# Patient Record
Sex: FEMALE | Race: ASIAN | NOT HISPANIC OR LATINO | Employment: FULL TIME | URBAN - METROPOLITAN AREA
[De-identification: names, ages, dates, MRNs, and addresses within clinical notes are randomized per-mention and may not be internally consistent; named-entity substitution may affect disease eponyms.]

---

## 2020-08-11 ENCOUNTER — OFFICE VISIT (OUTPATIENT)
Dept: OBGYN | Facility: CLINIC | Age: 35
End: 2020-08-11
Payer: COMMERCIAL

## 2020-08-11 VITALS
HEART RATE: 90 BPM | SYSTOLIC BLOOD PRESSURE: 111 MMHG | HEIGHT: 59 IN | BODY MASS INDEX: 24.39 KG/M2 | OXYGEN SATURATION: 98 % | DIASTOLIC BLOOD PRESSURE: 74 MMHG | WEIGHT: 121 LBS

## 2020-08-11 DIAGNOSIS — Z01.42 PAP SMEAR OF CERVIX TO CONFIRM NORMAL SMEAR FOLLOWING ABNORMAL SMEAR: ICD-10-CM

## 2020-08-11 DIAGNOSIS — Z30.433 ENCOUNTER FOR IUD REMOVAL AND REINSERTION: Primary | ICD-10-CM

## 2020-08-11 PROCEDURE — G0145 SCR C/V CYTO,THINLAYER,RESCR: HCPCS | Performed by: OBSTETRICS & GYNECOLOGY

## 2020-08-11 PROCEDURE — 58300 INSERT INTRAUTERINE DEVICE: CPT | Performed by: OBSTETRICS & GYNECOLOGY

## 2020-08-11 PROCEDURE — G0476 HPV COMBO ASSAY CA SCREEN: HCPCS | Performed by: OBSTETRICS & GYNECOLOGY

## 2020-08-11 PROCEDURE — 58301 REMOVE INTRAUTERINE DEVICE: CPT | Performed by: OBSTETRICS & GYNECOLOGY

## 2020-08-11 ASSESSMENT — MIFFLIN-ST. JEOR: SCORE: 1149.48

## 2020-08-11 NOTE — PROGRESS NOTES
Nna Ramos is a 35 year old  who desires a new Mirena IUD.  She feels well and denies signif sx. No contraindications upon review. No LMP recorded. (Menstrual status: IUD).   Menses every 1 month x 3-5 days.   Had considered ParaGard for 10 yr benefit but has done well with Mirena and will continue. Some dyspareunia for  recently and wondered about trimming strings shorter now.   Past ASCUS in 2014 and neg HRHPV and overdue for follow-up.   Family doing well except mother has brain cancer for past two yrs and followed at [facility].     Past medical, obstetrical, surgical, family and social history reviewed and as noted or updated in chart.     IUD contraception and insertion procedure discussed including method, effectiveness, limitations, proper use, risks, benefits and alternatives.  She has reviewed the package insert information and additional educational materials and desires to proceed.     PROCEDURE:    IUD Removal    I discussed the removal procedure, objectives, risks, complications and future contraceptive methods as indicated.  Patient understood and desired to proceed.    After appropriate preparation, a Pap/HRHPV was taken and the IUD tip was noted protruding at the cervical os along with curled up strings. The Mirena IUD was removed intact. No complications.     Mirena Insertion    A preliminary bimanual exam was performed and was normal with posterior normal size uterus.  The cervix was cleansed with Betadine. A cervical tenaculum was placed and the uterus sounded to 7 cm. Maintaining sterile technique, the IUD was inserted into the uterine cavity and the strings trimmed to 2.5 to 3 cm. No complications. Patient tolerated the procedure well.   NDC: 18368-209-78. Lot: [id number]. Exp: .    Instructions and precautions reviewed. RTC in 5 weeks for recheck. Shorten strings prn.     Assessment:   Encounter Diagnoses   Name Primary?     Encounter for IUD removal and reinsertion Yes     Pap smear  of cervix to confirm normal smear following abnormal smear      I reviewed the condition, causes, differential diagnosis, prognosis, evaluation and management considerations and options.  Questions answered and information given. See orders.     Plan and Recommendations:     Total encounter time (physician together with patient) = 25min. Direct counseling, education and care coordination time (physician together with patient) = 15min. with this additional separate time spent counseling on the evaluation and management of the patient's condition(s) beyond discussion of  the procedure itself including its risks, benefits and alternatives and beyond preliminary examination and performance of the procedure itself.    A/P:  Nan was seen today for iud.    Diagnoses and all orders for this visit:    Encounter for IUD removal and reinsertion  -     INSERT INTRAUTERINE DEVICE [51734]  -     levonorgestrel (MIRENA) 20 MCG/24HR IUD 20 mcg  -     REMOVE INTRAUTERINE DEVICE    Pap smear of cervix to confirm normal smear following abnormal smear  -     Pap imaged thin layer diagnostic with HPV (select HPV order below)  -     HPV High Risk Types DNA Cervical          Jay Franco MD

## 2020-08-11 NOTE — PATIENT INSTRUCTIONS
If you have any questions regarding your visit, Please contact your care team.     [email or web address]: [phone]  [facility] CLINIC HOURS TELEPHONE NUMBER       Jay Franco M.D.      Joycelyn Santa-Medical Assistant    Salud-  Ragini-     Monday-[place]  8:00a.m-4:45 p.m  Tuesday-[place]  9:00a.m-4:00 p.m  Wednesday-[place] 8:00a.m-4:45 p.m.  Thursday-[place]  8:00a.m-4:45 p.m.  Friday-[place]  8:00a.m-4:45 p.m. [facility]  [address]  [place], [place] [address] [phone] ask [facility]  [phone] Fax  Imaging [phone]    [facility]  [address]  [place], [place] [address] [phone]    [facility]  [address] [place] [address] [phone] [facility]  [phone] Fax  Imaging [phone]     Urgent Care locations:    [facility] Monday-Friday  5 pm - 9 pm  Saturday and Sunday   9 am - 5 pm  Monday-Friday   11 am - 9 pm  Saturday and Sunday   9 am - 5 pm   [phone]   If you need a medication refill, please contact your pharmacy. Please allow 3 business days for your refill to be completed.  As always, Thank you for trusting us with your healthcare needs!

## 2020-08-11 NOTE — LETTER
August 20, 2020    Nan Ramos  [address] [address]    Dear ,  This letter is regarding your recent Pap smear (cervical cancer screening) and Human Papillomavirus (HPV) test.  We are happy to inform you that your Pap smear result is normal. Cervical cancer is closely linked with certain types of HPV. Your results showed no evidence of high-risk HPV.  We recommend you have your next PAP smear and HPV test in 5 years.  You will still need to return to the clinic every year for an annual exam and other preventive tests.  If you have additional questions regarding this result, please call our registered nurse, Hannah at [phone].  Sincerely,    Jay Franco MD //rosanna

## 2020-08-14 LAB
COPATH REPORT: NORMAL
PAP: NORMAL

## 2020-08-18 LAB
FINAL DIAGNOSIS: NORMAL
HPV HR 12 DNA CVX QL NAA+PROBE: NEGATIVE
HPV16 DNA SPEC QL NAA+PROBE: NEGATIVE
HPV18 DNA SPEC QL NAA+PROBE: NEGATIVE
SPECIMEN DESCRIPTION: NORMAL
SPECIMEN SOURCE CVX/VAG CYTO: NORMAL

## 2020-09-01 ENCOUNTER — OFFICE VISIT (OUTPATIENT)
Dept: FAMILY MEDICINE | Facility: CLINIC | Age: 35
End: 2020-09-01
Payer: COMMERCIAL

## 2020-09-01 VITALS
HEART RATE: 93 BPM | SYSTOLIC BLOOD PRESSURE: 115 MMHG | OXYGEN SATURATION: 98 % | TEMPERATURE: 97.1 F | WEIGHT: 121 LBS | RESPIRATION RATE: 16 BRPM | HEIGHT: 59 IN | BODY MASS INDEX: 24.39 KG/M2 | DIASTOLIC BLOOD PRESSURE: 73 MMHG

## 2020-09-01 DIAGNOSIS — R51.9 HEADACHE, UNSPECIFIED HEADACHE TYPE: Primary | ICD-10-CM

## 2020-09-01 DIAGNOSIS — M54.9 UPPER BACK PAIN ON RIGHT SIDE: ICD-10-CM

## 2020-09-01 PROCEDURE — 99203 OFFICE O/P NEW LOW 30 MIN: CPT | Performed by: FAMILY MEDICINE

## 2020-09-01 RX ORDER — OMEPRAZOLE 40 MG/1
40 CAPSULE, DELAYED RELEASE ORAL
COMMUNITY
Start: 2020-07-13 | End: 2022-04-26

## 2020-09-01 RX ORDER — CYCLOBENZAPRINE HCL 10 MG
10 TABLET ORAL 3 TIMES DAILY PRN
Qty: 40 TABLET | Refills: 1 | Status: SHIPPED | OUTPATIENT
Start: 2020-09-01 | End: 2022-04-26

## 2020-09-01 RX ORDER — DICLOFENAC SODIUM 75 MG/1
75 TABLET, DELAYED RELEASE ORAL 2 TIMES DAILY PRN
Qty: 60 TABLET | Refills: 3 | Status: SHIPPED | OUTPATIENT
Start: 2020-09-01 | End: 2022-04-26

## 2020-09-01 RX ORDER — ASPIRIN 81 MG
TABLET,CHEWABLE ORAL 4 TIMES DAILY PRN
Qty: 60 G | Refills: 5 | Status: SHIPPED | OUTPATIENT
Start: 2020-09-01 | End: 2022-04-26

## 2020-09-01 ASSESSMENT — MIFFLIN-ST. JEOR: SCORE: 1149.48

## 2020-09-01 NOTE — PROGRESS NOTES
"Subjective     Nan Ramos is a 35 year old female who presents to clinic today for the following health issues:    HPI       Neck Pain  Onset/Duration: couple of months   Description:   Location: right side   Radiation: headache   Intensity: moderate  Progression of Symptoms:  intermittent  Accompanying Signs & Symptoms:  Burning, tingling, prickly sensation in arm(s): YES- right hand   Numbness in arm(s): no  Weakness in arm(s):  no  Fever: no  Headache: YES  Nausea and/or vomiting: no  History:   Trauma: no  Previous neck pain: YES  Previous surgery or injections: no  Previous Imaging (MRI,X ray): no  Precipitating or alleviating factors: None  Does movement impact the pain:  no  Therapies tried and outcome: acetaminophen    -patient states that her mother had similar symptoms and was dx with cancer    Review of Systems   Constitutional, HEENT, cardiovascular, pulmonary, GI, , musculoskeletal, neuro, skin, endocrine and psych systems are negative, except as otherwise noted.      Objective    /73 (BP Location: Left arm, Patient Position: Chair, Cuff Size: Adult Regular)   Pulse 93   Temp 97.1  F (36.2  C) (Tympanic)   Resp 16   Ht 1.499 m (4' 11\")   Wt 54.9 kg (121 lb)   SpO2 98%   BMI 24.44 kg/m    Body mass index is 24.44 kg/m .  Physical Exam   GENERAL: healthy, alert and no distress  NECK: no adenopathy, no asymmetry, masses, or scars and thyroid normal to palpation  RESP: lungs clear to auscultation - no rales, rhonchi or wheezes  CV: regular rate and rhythm, normal S1 S2, no S3 or S4, no murmur, click or rub, no peripheral edema and peripheral pulses strong  ABDOMEN: soft, nontender, no hepatosplenomegaly, no masses and bowel sounds normal  MS: right upper back pain with palpation  NEURO: Normal strength and tone, mentation intact and speech normal      Assessment & Plan     Headache, unspecified headache type  Likely tension headaches. Patient worried about brain cancer due to mother having " similar symptoms. MRI scan ordered for rule out.   - MR Brain w/o & w Contrast; Future    Upper back pain on right side  Likely muscular pain due patient being a . Treat with nsaid, topical capsaisin and muscle relaxer as needed. RTC as needed if no improvements.  - diclofenac (VOLTAREN) 75 MG EC tablet; Take 1 tablet (75 mg) by mouth 2 times daily as needed for moderate pain  - Capsaicin (CAPSAICIN HP) 0.1 % cream; Apply topically 4 times daily as needed  - cyclobenzaprine (FLEXERIL) 10 MG tablet; Take 1 tablet (10 mg) by mouth 3 times daily as needed for muscle spasms       See Patient Instructions    Return in about 6 months (around 3/1/2021).    Shola Blackwell MD, MD  [facility]

## 2020-09-01 NOTE — PATIENT INSTRUCTIONS
At [facility], we strive to deliver an exceptional experience to you, every time we see you.  If you receive a survey in the mail, please send us back your thoughts. We really do value your feedback.    Based on your medical history, these are the current health maintenance/preventive care services that you are due for (some may have been done at this visit.)  Health Maintenance Due   Topic Date Due     PREVENTIVE CARE VISIT  05/21/2015     EYE EXAM  11/12/2015     PHQ-2  01/01/2020     INFLUENZA VACCINE (1) 09/01/2020         Suggested websites for health information:  Www.CENX.LiveExercise : Up to date and easily searchable information on multiple topics.  Www.medlineplus.gov : medication info, interactive tutorials, watch real surgeries online  Www.familydoctor.org : good info from the Academy of Family Physicians  Www.cdc.gov : public health info, travel advisories, epidemics (H1N1)  Www.aap.org : children's health info, normal development, vaccinations  Www.health.[facility].[place].us : [place] dept of health, public health issues in [place], N1N1    Your care team:                            Family Medicine Internal Medicine   MD Victorino Hernandez MD Shantel Branch-Fleming, MD Katya Georgiev PA-C Nam Ho, MD Pediatrics   SALOME Young, MD Selena Darling CNP, MD Deborah Mielke, MD Kim Thein, APRN CNP      Clinic hours: Monday - Thursday 7 am-7 pm; Fridays 7 am-5 pm.   Urgent care: Monday - Friday 11 am-9 pm; Saturday and Sunday 9 am-5 pm.  Pharmacy : Monday -Thursday 8 am-8 pm; Friday 8 am-6 pm; Saturday and Sunday 9 am-5 pm.     Clinic: [phone]   Pharmacy: [phone]

## 2020-09-10 ENCOUNTER — OFFICE VISIT (OUTPATIENT)
Dept: OBGYN | Facility: CLINIC | Age: 35
End: 2020-09-10
Payer: COMMERCIAL

## 2020-09-10 VITALS — DIASTOLIC BLOOD PRESSURE: 81 MMHG | OXYGEN SATURATION: 97 % | HEART RATE: 89 BPM | SYSTOLIC BLOOD PRESSURE: 117 MMHG

## 2020-09-10 DIAGNOSIS — Z30.431 IUD CHECK UP: Primary | ICD-10-CM

## 2020-09-10 DIAGNOSIS — K64.9 HEMORRHOIDS, UNSPECIFIED HEMORRHOID TYPE: ICD-10-CM

## 2020-09-10 PROCEDURE — 99213 OFFICE O/P EST LOW 20 MIN: CPT | Performed by: OBSTETRICS & GYNECOLOGY

## 2020-09-10 NOTE — PATIENT INSTRUCTIONS
If you have any questions regarding your visit, Please contact your care team.     [facility]: [phone]  [facility] CLINIC HOURS TELEPHONE NUMBER       Jay Franco M.D.      Joycelyn Santa-Medical Assistant    Salud-  Ragini-     Monday-[place]  8:00a.m-4:45 p.m  Tuesday-[place]  9:00a.m-4:00 p.m  Wednesday-[place] 8:00a.m-4:45 p.m.  Thursday-[place]  8:00a.m-4:45 p.m.  Friday-[place]  8:00a.m-4:45 p.m. [facility]  [address]  [place], [place] [address] [phone] ask [facility]  [phone] Fax  Imaging [phone]    [facility]  [address]  [place], [place] [address] [phone]    [facility]  [address] [facility] [place] [address] [phone] [facility]  [phone] Fax  Imaging [phone]     Urgent Care locations:    [facility] Monday-Friday  5 pm - 9 pm  Saturday and Sunday   9 am - 5 pm  Monday-Friday   11 am - 9 pm  Saturday and Sunday   9 am - 5 pm   [phone]   If you need a medication refill, please contact your pharmacy. Please allow 3 business days for your refill to be completed.  As always, Thank you for trusting us with your healthcare needs!

## 2020-09-10 NOTE — PROGRESS NOTES
Nan Ramos is a 35 year old year old who is here today for a recheck of IUD.  See prior notes.     Significant interval changes: none.  No signif signs, symptoms or concerns except recurrent prolapsing hemorrhoids. No dyspareunia.     Past medical, obstetrical, surgical, family and social history reviewed and as noted or updated in chart.      ROS:     Systems reviewed include constitutional; gastrointestinal; genitourinary; psychological; hematologic/lymphatic and endocrine. These systems were negative for significant symptoms except for the following additional: none ; see HPI.    Exam: /81 (BP Location: Left arm, Patient Position: Chair, Cuff Size: Adult Regular)   Pulse 89   SpO2 97%   Breastfeeding No  Constitutionally normal. Menses now, IUD strings in normal position, uterus non-tender, minimal hemorrhoids on rectal exam.    A/P: Total encounter time (physician together with patient) = 15min. Direct counseling, education and care coordination time (physician together with patient) = 10min. This included the following: I reviewed the condition, causes, differential diagnosis, prognosis, evaluation and management considerations and options. Questions answered and information given.    Satisfactory recheck. Return to clinic in 12 months. Encouraged fiber and fluids for hemorrhoids and may use over the counter cream prn itching, surgical evaluation later prn. Instructions reviewed. See orders. Continue other general medical care.      Nan was seen today for iud.    Diagnoses and all orders for this visit:    IUD check up    Hemorrhoids, unspecified hemorrhoid type  -     GENERAL SURG ADULT REFERRAL; Future        Jay Franco MD

## 2020-09-28 ENCOUNTER — ANCILLARY PROCEDURE (OUTPATIENT)
Dept: MRI IMAGING | Facility: CLINIC | Age: 35
End: 2020-09-28
Attending: FAMILY MEDICINE
Payer: COMMERCIAL

## 2020-09-28 DIAGNOSIS — R51.9 HEADACHE, UNSPECIFIED HEADACHE TYPE: ICD-10-CM

## 2020-09-28 PROCEDURE — A9585 GADOBUTROL INJECTION: HCPCS | Performed by: FAMILY MEDICINE

## 2020-09-28 PROCEDURE — 70553 MRI BRAIN STEM W/O & W/DYE: CPT | Performed by: RADIOLOGY

## 2020-09-28 RX ORDER — GADOBUTROL 604.72 MG/ML
7.5 INJECTION INTRAVENOUS ONCE
Status: COMPLETED | OUTPATIENT
Start: 2020-09-28 | End: 2020-09-28

## 2020-09-28 RX ADMIN — GADOBUTROL 5.5 ML: 604.72 INJECTION INTRAVENOUS at 09:03

## 2020-10-04 ENCOUNTER — NURSE TRIAGE (OUTPATIENT)
Dept: NURSING | Facility: CLINIC | Age: 35
End: 2020-10-04

## 2020-10-05 NOTE — TELEPHONE ENCOUNTER
Nan would like a call about her results from the MRI Brain done on 9/28/2020. [place] update her at [phone].

## 2020-10-06 ENCOUNTER — TELEPHONE (OUTPATIENT)
Dept: FAMILY MEDICINE | Facility: CLINIC | Age: 35
End: 2020-10-06

## 2020-10-06 NOTE — TELEPHONE ENCOUNTER
----- Message from Iris Lux sent at 10/5/2020  6:26 PM CDT -----      ----- Message -----  From: Shola Blackwell MD  Sent: 9/29/2020  11:06 AM CDT  To: [facility]    Patient needs a Pashto . Please notify patient that her MRI scan shows not signs of cancer (she was very worried about this since her mother was diagnosed with cancer). These was non-specific findings that can be caused by increased in pressure within the skull. I would like patient to see Neurology at [place] at [phone] for further evaluation and recommendations on the headaches she is experiencing.    Shola Blackwell MD

## 2020-10-26 NOTE — PROGRESS NOTES
[facility] Neurology Consultation    Nan Ramos MRN# [id number]   Age: 35 year old YOB: 1985     Requesting physician: Shola Blackwell  No Ref-Primary, Physician     Reason for Consultation: headaches      History of Presenting Symptoms:   Nan Ramos is a 35 year old female who presents today for evaluation of headaches.    Headaches have been occurring for the last few months. Headaches are currently happening every day. Sometime the headaches are right sided and sometimes bilateral. Headaches are generally mild, at worse a 4/10. Patient has difficulty describing the quality of the pain. She takes tylenol as needed, which helps. She usually takes 1 tablet of tylenol daily. She doesn't get any nausea or vomiting. No sensitivity to light, but she does not get noise sensitivity. No recent changes in the vision. Rarely she'll get ringing in the ears. No episodes of vision going completely black with standing. Patient had an MRI of the brain which showed non specific changes which could be seen in IIH. Prior to current headaches, patient reports she got intermittent stress headaches, but not commonly.     Sometimes she feels some numbness/tingling in the right arm.     She has allergies today. No other active health problems.         Past Medical History:     Patient Active Problem List   Diagnosis     H. pylori infection     Past Medical History:   Diagnosis Date     ASCUS of cervix with negative high risk HPV 08/19/2011    resolved     Seasonal allergies 2000        Past Surgical History:     Past Surgical History:   Procedure Laterality Date     HC INSERTION INTRAUTERINE DEVICE  2014, 2020    Mirena x 2     HC REMOVE INTRAUTERINE DEVICE  2020     HEMORRHOID SURGERY  1992     TONSILLECTOMY          Social History:     Social History     Tobacco Use     Smoking status: Never Smoker     Smokeless tobacco: Never Used   Substance Use Topics     Alcohol use: No     Drug use: No        Family History:     Family History  "  Problem Relation Age of Onset     Diabetes Father      Brain Cancer Mother         glioblastoma     Hypertension No family hx of      Cerebrovascular Disease No family hx of      Breast Cancer No family hx of      Cancer - colorectal No family hx of      Cancer No family hx of      Thyroid Disease No family hx of      Glaucoma No family hx of      Macular Degeneration No family hx of         Medications:     Current Outpatient Medications   Medication Sig     cyclobenzaprine (FLEXERIL) 10 MG tablet Take 1 tablet (10 mg) by mouth 3 times daily as needed for muscle spasms     diclofenac (VOLTAREN) 75 MG EC tablet Take 1 tablet (75 mg) by mouth 2 times daily as needed for moderate pain     levonorgestrel (MIRENA) 20 MCG/24HR IUD 1 each by Intrauterine route once     azelastine (OPTIVAR) 0.05 % SOLN      Capsaicin (CAPSAICIN HP) 0.1 % cream Apply topically 4 times daily as needed (Patient not taking: Reported on 10/27/2020)     hydrocortisone 2.5 % cream      omeprazole (PRILOSEC) 40 MG DR capsule Take 40 mg by mouth     Current Facility-Administered Medications   Medication     levonorgestrel (MIRENA) 20 MCG/24HR IUD 20 mcg        Allergies:   No Known Allergies     Review of Systems:   A comprehensive 10 point review of systems (constitutional, ENT, cardiac, peripheral vascular, lymphatic, respiratory, GI, , Musculoskeletal, skin, Neurological) was performed and found to be negative except as described in this note.      Physical Exam:   Vitals: /74   Pulse 104   Ht 1.499 m (4' 11\")   Wt 54.9 kg (121 lb)   SpO2 95%   BMI 24.44 kg/m     General: Seated comfortably in no acute distress.  HEENT: Neck supple with normal range of motion. Mild paracervical muscle tenderness and tightness.  Optic discs sharp and vasculature normal on funduscopic exam.   Heart: Regular rate  Lungs: breathing comfortably  Extremities: no edema  Skin: No rashes  Neurologic:     Mental Status: Fully alert, attentive and oriented. " Normal memory and fund of knowledge. Language normal, speech clear and fluent, no paraphasic errors.      Cranial Nerves: Visual fields intact. PERRL. EOMI with normal smooth pursuit. Facial sensation intact/symmetric. Facial movements symmetric. Hearing not formally tested but intact to conversation. Palate elevation symmetric, uvula midline. No dysarthria. Shoulder shrug strong bilaterally. Tongue protrusion midline.     Motor: No tremors or other abnormal movements observed. Muscle tone normal throughout. No pronator drift. Normal/symmetric rapid finger tapping. Strength 5/5 throughout upper and lower extremities.     Deep Tendon Reflexes: 1+/symmetric throughout upper and lower extremities. Toes downgoing bilaterally.     Sensory: Intact/symmetric to light touch, temperature, vibration and proprioception throughout upper and lower extremities. Negative Romberg.      Coordination: Finger-nose-finger and heel-shin intact without dysmetria. Rapid alternating movements intact/symmetric with normal speed and rhythm.     Gait: Normal, steady casual gait. Able to walk on toes, heels and tandem without difficulty.         Data: Pertinent prior to visit   Imaging:  MRI brain 9/28/2020  Impression:   1. No acute intracranial pathology.  2. Partially empty sella. Low-lying cerebellar tonsils. Slit-like  ventricles. These findings are non-specific, but can be seen in  idiopathic intracranial hypertension, should be considered as a  differential diagnosis.  3. Mild inflammatory sinus disease.  Personally reviewed     Procedures:  None    Laboratory:  None         Assessment and Plan:   Assessment:  Nan Ramos is a 35 year old female who presents today for evaluation of headaches. Headaches have been occurring over the last several month and are currently happening daily. Headache are both right sided and generalized. She denies any migrainous features. At its worse pain is a 4/10. She reports neck and back tension. MRI brain  with non specific changes which could be seen in IIH. Neurological exam including fundoscopic exam was unremarkable. Low suspicion currently for IIH as an etiology to headache given headache characteristics and normal fundoscopic exam. Headache characteristics are most compatible with a tension-type headache. We discussed trial of physical therapy for treatment of tension-type headache. She can continue tylenol and ibuprofen prn, but we discussed limiting each to 4 doses weekly, given risk of medication overuse headache.       Plan:  - Physical therapy referral  - PRN tylenol 1000 mg and ibuprofen 800 mg    Follow up in Neurology clinic in 12 weeks or earlier as needed should new concerns arise.    Flako Kim MD   of Neurology  [facility]

## 2020-10-27 ENCOUNTER — OFFICE VISIT (OUTPATIENT)
Dept: NEUROLOGY | Facility: CLINIC | Age: 35
End: 2020-10-27
Attending: FAMILY MEDICINE
Payer: COMMERCIAL

## 2020-10-27 VITALS
OXYGEN SATURATION: 95 % | WEIGHT: 121 LBS | HEIGHT: 59 IN | HEART RATE: 104 BPM | DIASTOLIC BLOOD PRESSURE: 74 MMHG | BODY MASS INDEX: 24.39 KG/M2 | SYSTOLIC BLOOD PRESSURE: 112 MMHG

## 2020-10-27 DIAGNOSIS — G44.209 TENSION HEADACHE: Primary | ICD-10-CM

## 2020-10-27 PROCEDURE — 99204 OFFICE O/P NEW MOD 45 MIN: CPT | Performed by: INTERNAL MEDICINE

## 2020-10-27 ASSESSMENT — MIFFLIN-ST. JEOR: SCORE: 1149.48

## 2020-10-27 ASSESSMENT — PAIN SCALES - GENERAL: PAINLEVEL: NO PAIN (0)

## 2020-10-27 NOTE — PATIENT INSTRUCTIONS
When you take tylenol take 1000 mg. Limit to 4 doses per week.     When you take ibuprofen (Advil), take 800 mg. Limit to 4 doses per week.

## 2020-10-27 NOTE — NURSING NOTE
"Nan Ramos's goals for this visit include: consult  She requests these members of her care team be copied on today's visit information:     PCP: No Ref-Primary, Physician    Referring Provider:  Shola Blackwell MD  [address] [place] [address]    /74   Pulse 104   Ht 1.499 m (4' 11\")   Wt 54.9 kg (121 lb)   SpO2 95%   BMI 24.44 kg/m      Do you need any medication refills at today's visit? n  " Previous Accession (Optional): [id number] Previous Accession (Optional): [id number]

## 2020-10-27 NOTE — LETTER
10/27/2020         RE: Nan Ramos  [address]  [place] [place] [address]        Dear Colleague,    Thank you for referring your patient, Nan Ramos, to the [facility] [place]. Please see a copy of my visit note below.    [facility] Neurology Consultation    Nan Ramos MRN# [id number]   Age: 35 year old YOB: 1985     Requesting physician: Shola Feldman Ref-Primary, Physician     Reason for Consultation: headaches      History of Presenting Symptoms:   Nan Ramos is a 35 year old female who presents today for evaluation of headaches.    Headaches have been occurring for the last few months. Headaches are currently happening every day. Sometime the headaches are right sided and sometimes bilateral. Headaches are generally mild, at worse a 4/10. Patient has difficulty describing the quality of the pain. She takes tylenol as needed, which helps. She usually takes 1 tablet of tylenol daily. She doesn't get any nausea or vomiting. No sensitivity to light, but she does not get noise sensitivity. No recent changes in the vision. Rarely she'll get ringing in the ears. No episodes of vision going completely black with standing. Patient had an MRI of the brain which showed non specific changes which could be seen in IIH. Prior to current headaches, patient reports she got intermittent stress headaches, but not commonly.     Sometimes she feels some numbness/tingling in the right arm.     She has allergies today. No other active health problems.         Past Medical History:     Patient Active Problem List   Diagnosis     H. pylori infection     Past Medical History:   Diagnosis Date     ASCUS of cervix with negative high risk HPV 08/19/2011    resolved     Seasonal allergies 2000        Past Surgical History:     Past Surgical History:   Procedure Laterality Date     HC INSERTION INTRAUTERINE DEVICE  2014, 2020    Mirena x 2     HC REMOVE INTRAUTERINE DEVICE  2020     HEMORRHOID SURGERY   "1992     TONSILLECTOMY          Social History:     Social History     Tobacco Use     Smoking status: Never Smoker     Smokeless tobacco: Never Used   Substance Use Topics     Alcohol use: No     Drug use: No        Family History:     Family History   Problem Relation Age of Onset     Diabetes Father      Brain Cancer Mother         glioblastoma     Hypertension No family hx of      Cerebrovascular Disease No family hx of      Breast Cancer No family hx of      Cancer - colorectal No family hx of      Cancer No family hx of      Thyroid Disease No family hx of      Glaucoma No family hx of      Macular Degeneration No family hx of         Medications:     Current Outpatient Medications   Medication Sig     cyclobenzaprine (FLEXERIL) 10 MG tablet Take 1 tablet (10 mg) by mouth 3 times daily as needed for muscle spasms     diclofenac (VOLTAREN) 75 MG EC tablet Take 1 tablet (75 mg) by mouth 2 times daily as needed for moderate pain     levonorgestrel (MIRENA) 20 MCG/24HR IUD 1 each by Intrauterine route once     azelastine (OPTIVAR) 0.05 % SOLN      Capsaicin (CAPSAICIN HP) 0.1 % cream Apply topically 4 times daily as needed (Patient not taking: Reported on 10/27/2020)     hydrocortisone 2.5 % cream      omeprazole (PRILOSEC) 40 MG DR capsule Take 40 mg by mouth     Current Facility-Administered Medications   Medication     levonorgestrel (MIRENA) 20 MCG/24HR IUD 20 mcg        Allergies:   No Known Allergies     Review of Systems:   A comprehensive 10 point review of systems (constitutional, ENT, cardiac, peripheral vascular, lymphatic, respiratory, GI, , Musculoskeletal, skin, Neurological) was performed and found to be negative except as described in this note.      Physical Exam:   Vitals: /74   Pulse 104   Ht 1.499 m (4' 11\")   Wt 54.9 kg (121 lb)   SpO2 95%   BMI 24.44 kg/m     General: Seated comfortably in no acute distress.  HEENT: Neck supple with normal range of motion. Mild paracervical " muscle tenderness and tightness.  Optic discs sharp and vasculature normal on funduscopic exam.   Heart: Regular rate  Lungs: breathing comfortably  Extremities: no edema  Skin: No rashes  Neurologic:     Mental Status: Fully alert, attentive and oriented. Normal memory and fund of knowledge. Language normal, speech clear and fluent, no paraphasic errors.      Cranial Nerves: Visual fields intact. PERRL. EOMI with normal smooth pursuit. Facial sensation intact/symmetric. Facial movements symmetric. Hearing not formally tested but intact to conversation. Palate elevation symmetric, uvula midline. No dysarthria. Shoulder shrug strong bilaterally. Tongue protrusion midline.     Motor: No tremors or other abnormal movements observed. Muscle tone normal throughout. No pronator drift. Normal/symmetric rapid finger tapping. Strength 5/5 throughout upper and lower extremities.     Deep Tendon Reflexes: 1+/symmetric throughout upper and lower extremities. Toes downgoing bilaterally.     Sensory: Intact/symmetric to light touch, temperature, vibration and proprioception throughout upper and lower extremities. Negative Romberg.      Coordination: Finger-nose-finger and heel-shin intact without dysmetria. Rapid alternating movements intact/symmetric with normal speed and rhythm.     Gait: Normal, steady casual gait. Able to walk on toes, heels and tandem without difficulty.         Data: Pertinent prior to visit   Imaging:  MRI brain 9/28/2020  Impression:   1. No acute intracranial pathology.  2. Partially empty sella. Low-lying cerebellar tonsils. Slit-like  ventricles. These findings are non-specific, but can be seen in  idiopathic intracranial hypertension, should be considered as a  differential diagnosis.  3. Mild inflammatory sinus disease.  Personally reviewed     Procedures:  None    Laboratory:  None         Assessment and Plan:   Assessment:  Nan Ramos is a 35 year old female who presents today for evaluation of  headaches. Headaches have been occurring over the last several month and are currently happening daily. Headache are both right sided and generalized. She denies any migrainous features. At its worse pain is a 4/10. She reports neck and back tension. MRI brain with non specific changes which could be seen in IIH. Neurological exam including fundoscopic exam was unremarkable. Low suspicion currently for IIH as an etiology to headache given headache characteristics and normal fundoscopic exam. Headache characteristics are most compatible with a tension-type headache. We discussed trial of physical therapy for treatment of tension-type headache. She can continue tylenol and ibuprofen prn, but we discussed limiting each to 4 doses weekly, given risk of medication overuse headache.       Plan:  - Physical therapy referral  - PRN tylenol 1000 mg and ibuprofen 800 mg    Follow up in Neurology clinic in 12 weeks or earlier as needed should new concerns arise.    Flako Kim MD   of Neurology  [facility]          Again, thank you for allowing me to participate in the care of your patient.        Sincerely,        Flako Kim MD

## 2020-11-13 ENCOUNTER — THERAPY VISIT (OUTPATIENT)
Dept: PHYSICAL THERAPY | Facility: CLINIC | Age: 35
End: 2020-11-13
Attending: INTERNAL MEDICINE
Payer: COMMERCIAL

## 2020-11-13 DIAGNOSIS — G44.209 TENSION HEADACHE: ICD-10-CM

## 2020-11-13 DIAGNOSIS — M54.2 CERVICALGIA: ICD-10-CM

## 2020-11-13 PROCEDURE — 97110 THERAPEUTIC EXERCISES: CPT | Mod: GP | Performed by: PHYSICAL THERAPIST

## 2020-11-13 PROCEDURE — 97161 PT EVAL LOW COMPLEX 20 MIN: CPT | Mod: GP | Performed by: PHYSICAL THERAPIST

## 2020-11-13 PROCEDURE — 97140 MANUAL THERAPY 1/> REGIONS: CPT | Mod: GP | Performed by: PHYSICAL THERAPIST

## 2020-11-13 NOTE — LETTER
[facility] PHYSICAL THERAPY  [address]  [place] [facility] [address]  [phone]    2020    Re: Nan Ramos   :   1985  MRN:  [id number]   REFERRING PHYSICIAN:   Flako Kim    [facility] PHYSICAL THERAPY  Date of Initial Evaluation:  20  Visits:  Rxs Used: 1  Reason for Referral:     Tension headache  Cervicalgia    EVALUATION SUMMARY    [place] [facility] Initial Evaluation    Subjective:  The history is provided by the patient. No  was used.     Therapist Generated HPI Evaluation  Problem details: Having headaches more recently over past 4 months and had MRI of brain and everything was fine. Thinking it could be from stress from her mom having cancer, moving into new house and kids are now being schooled from home which has been stressful. .         Type of problem:  Cervical spine.    This is a chronic condition.  Condition occurred with:  Insidious onset.  Where injured: stress.  Patient reports pain:  Cervical right side.  Pain is described as aching and is intermittent.  Pain radiates to:  Head, upper arm right, shoulder right, lower arm right and hand right. Pain is worse in the P.M..  Since onset symptoms are gradually worsening.  Associated symptoms:  Headache, numbness, tingling and loss of strength. Exacerbated by: lying on the right side, wokring on the computert, doing nails at work.  Relieved by: uses biofreeze which helps.   Special tests included:  MRI (brain : normal findins).    Restrictions due to condition include:  Working in normal job without restrictions.  Barriers include:  None as reported by patient.    Patient Health History  Nan Ramos being seen for headaches.     Problem began: 10/27/2020.   Problem occurred: stress   Pain is reported as 6/10 on pain scale.  General health as reported by patient is good.    Pertinent medical history includes: none.   Red flags:  None as reported by  patient.  Medical allergies: none.   Surgeries include:  None.    Current medications:  None.    Current occupation is nails technician.   Primary job tasks include:  Prolonged sitting and repetitive tasks.      Objective:  Standing Alignment:    Cervical/Thoracic:  Normal and thoracic kyphosis increased  Shoulder/UE:  Rounded shoulders       Cervical/Thoracic Evaluation  Headaches: cervical (right cervical. )    Cervical Myotomes:  normal    Cervical Dermatomes:    C3 left:  Normal-light touch     C3 right:  Normal-light touch    C4 left:  Normal-light touch     C4 right:  Normal-light touch    C5 left:  Normal-light touch     C5 right:  Hypo-light touch    C6 left:  Normal-light touch     C6 right:  Normal-light touch    C7 left:  Normal-light touch     C7 right:  Normal-light touch    C8 left:  Normal-light touch     C8 right:  Normal-light touch    T1 left:  Normal-light touch     T1 right:  Normal-light touch    Cervical Palpation:    Tenderness present at Right:    Upper Trap; Levator and Erector Spinae    Cervical Stability/Joint Clearing:    Left negative at: TLA LAT or VAT    Right negative at:  TLA LAT or VAT  Negative:ALAR Ligament and TLA AP    Spinal Segmental Conclusions:    Level:  Hypo at T1, T2, T3, T4, T5, T6, T7 and T8    [place] Cervical Evaluation    Movement Loss:  Protrusion (PRO): nil  Flexion (Flex): nil  Retraction (RET): nil  Extension (EXT): pain and mod  Lateral Flexion Right (LF R): nil  Lateral Flexion Left (LF L): min and pain  Rotation Right (ROT R): nil  Rotation Left (ROT L): nil    Test Movements:  Pretest Pain Sitting: 3-4/10 right cervical pain  PRO: During: no effect  After: no effect    Repeat PRO: During: no effect  After: no effect  Mechanical Response: no effect    RET EXT: During: increases  After: no worse    Repeat RET EXT: During: increases  After: no worse  Mechanical Response: IncROM    Conclusion: derangement (posterior derangment symmetrical with symptoms above the  elbow. )    Principle of Treatment:  Extension: retract/ ext x 10 reps every 2 hours 6-8x/ day.     Assessment/Plan:    Patient is a 35 year old female with cervical complaints.    Patient has the following significant findings with corresponding treatment plan.                Diagnosis 1:  Cervical tension headaches  Pain -  hot/cold therapy, US, manual therapy, self management, education, directional preference exercise and home program  Decreased ROM/flexibility - manual therapy, therapeutic exercise, therapeutic activity and home program  Decreased joint mobility - manual therapy, therapeutic exercise, therapeutic activity and home program  Decreased function - therapeutic activities and home program  Impaired posture - neuro re-education, therapeutic activities and home program    Therapy Evaluation Codes:   1) History comprised of:   Personal factors that impact the plan of care:      None.    Comorbidity factors that impact the plan of care are:      None.     Medications impacting care: None.  2) Examination of Body Systems comprised of:   Body structures and functions that impact the plan of care:      Cervical spine.   Activity limitations that impact the plan of care are:      Sitting.  3) Clinical presentation characteristics are:   Stable/Uncomplicated.  4) Decision-Making    Low complexity using standardized patient assessment instrument and/or measureable assessment of functional outcome.  Cumulative Therapy Evaluation is: Low complexity.    Previous and current functional limitations:  (See Goal Flow Sheet for this information)    Short term and Long term goals: (See Goal Flow Sheet for this information)     Communication ability:  Patient appears to be able to clearly communicate and understand verbal and written communication and follow directions correctly.    Treatment Explanation - The following has been discussed with the patient:   RX ordered/plan of care  Anticipated outcomes  Possible risks  and side effects    This patient would benefit from PT intervention to resume normal activities.   Rehab potential is excellent.    Frequency:  1 X week, once daily  Duration:  for 8 weeks  Discharge Plan:  Achieve all LTG.  Independent in home treatment program.  Reach maximal therapeutic benefit.    Thank you for your referral.    INQUIRIES  Therapist: Edinson Schwartz DPT  [facility] PHYSICAL THERAPY  [address]  [facility] [address]  Phone: [phone]  Fax: [phone]

## 2020-11-13 NOTE — PROGRESS NOTES
[place] [facility] Initial Evaluation  Subjective:  The history is provided by the patient. No  was used.   Therapist Generated HPI Evaluation  Problem details: Having headaches more recently over past 4 months and had MRI of brain and everything was fine. Thinking it could be from stress from her mom having cancer, moving into new house and kids are now being schooled from home which has been stressful. .         Type of problem:  Cervical spine.    This is a chronic condition.  Condition occurred with:  Insidious onset.  Where injured: stress.  Patient reports pain:  Cervical right side.  Pain is described as aching and is intermittent.  Pain radiates to:  Head, upper arm right, shoulder right, lower arm right and hand right. Pain is worse in the P.M..  Since onset symptoms are gradually worsening.  Associated symptoms:  Headache, numbness, tingling and loss of strength. Exacerbated by: lying on the right side, wokring on the computert, doing nails at work.  Relieved by: uses biofreeze which helps.   Special tests included:  MRI (brain : normal findins).    Restrictions due to condition include:  Working in normal job without restrictions.  Barriers include:  None as reported by patient.    Patient Health History  Nanmerari Ramos being seen for headaches.     Problem began: 10/27/2020.   Problem occurred: stress   Pain is reported as 6/10 on pain scale.  General health as reported by patient is good.  Pertinent medical history includes: none.   Red flags:  None as reported by patient.  Medical allergies: none.   Surgeries include:  None.    Current medications:  None.    Current occupation is nails technician.   Primary job tasks include:  Prolonged sitting and repetitive tasks.                                    Objective:  Standing Alignment:    Cervical/Thoracic:  Normal and thoracic kyphosis increased  Shoulder/UE:  Rounded shoulders                                  Cervical/Thoracic  Evaluation    Headaches: cervical (right cervical. )  Cervical Myotomes:  normal                      Cervical Dermatomes:        C3 left:  Normal-light touch     C3 right:  Normal-light touch    C4 left:  Normal-light touch     C4 right:  Normal-light touch    C5 left:  Normal-light touch     C5 right:  Hypo-light touch    C6 left:  Normal-light touch     C6 right:  Normal-light touch    C7 left:  Normal-light touch     C7 right:  Normal-light touch    C8 left:  Normal-light touch     C8 right:  Normal-light touch    T1 left:  Normal-light touch     T1 right:  Normal-light touch  Cervical Palpation:      Tenderness present at Right:    Upper Trap; Levator and Erector Spinae    Cervical Stability/Joint Clearing:      Left negative at: TLA LAT or VAT    Right negative at:  TLA LAT or VAT  Negative:ALAR Ligament and TLA AP  Spinal Segmental Conclusions:    Level:  Hypo at T1, T2, T3, T4, T5, T6, T7 and T8                                                [place] Cervical Evaluation      Movement Loss:  Protrusion (PRO): nil  Flexion (Flex): nil  Retraction (RET): nil  Extension (EXT): pain and mod  Lateral Flexion Right (LF R): nil  Lateral Flexion Left (LF L): min and pain  Rotation Right (ROT R): nil  Rotation Left (ROT L): nil  Test Movements:  Pretest Pain Sitting: 3-4/10 right cervical pain  PRO: During: no effect  After: no effect    Repeat PRO: During: no effect  After: no effect  Mechanical Response: no effect    RET EXT: During: increases  After: no worse    Repeat RET EXT: During: increases  After: no worse  Mechanical Response: IncROM                        Conclusion: derangement (posterior derangment symmetrical with symptoms above the elbow. )  Principle of Treatment:      Extension: retract/ ext x 10 reps every 2 hours 6-8x/ day.                                              ROS    Assessment/Plan:    Patient is a 35 year old female with cervical complaints.    Patient has the following significant  findings with corresponding treatment plan.                Diagnosis 1:  Cervical tension headaches  Pain -  hot/cold therapy, US, manual therapy, self management, education, directional preference exercise and home program  Decreased ROM/flexibility - manual therapy, therapeutic exercise, therapeutic activity and home program  Decreased joint mobility - manual therapy, therapeutic exercise, therapeutic activity and home program  Decreased function - therapeutic activities and home program  Impaired posture - neuro re-education, therapeutic activities and home program    Therapy Evaluation Codes:   1) History comprised of:   Personal factors that impact the plan of care:      None.    Comorbidity factors that impact the plan of care are:      None.     Medications impacting care: None.  2) Examination of Body Systems comprised of:   Body structures and functions that impact the plan of care:      Cervical spine.   Activity limitations that impact the plan of care are:      Sitting.  3) Clinical presentation characteristics are:   Stable/Uncomplicated.  4) Decision-Making    Low complexity using standardized patient assessment instrument and/or measureable assessment of functional outcome.  Cumulative Therapy Evaluation is: Low complexity.    Previous and current functional limitations:  (See Goal Flow Sheet for this information)    Short term and Long term goals: (See Goal Flow Sheet for this information)     Communication ability:  Patient appears to be able to clearly communicate and understand verbal and written communication and follow directions correctly.  Treatment Explanation - The following has been discussed with the patient:   RX ordered/plan of care  Anticipated outcomes  Possible risks and side effects  This patient would benefit from PT intervention to resume normal activities.   Rehab potential is excellent.    Frequency:  1 X week, once daily  Duration:  for 8 weeks  Discharge Plan:  Achieve all  LTG.  Independent in home treatment program.  Reach maximal therapeutic benefit.    Please refer to the daily flowsheet for treatment today, total treatment time and time spent performing 1:1 timed codes.

## 2020-11-25 ENCOUNTER — THERAPY VISIT (OUTPATIENT)
Dept: PHYSICAL THERAPY | Facility: CLINIC | Age: 35
End: 2020-11-25
Payer: COMMERCIAL

## 2020-11-25 DIAGNOSIS — G44.209 TENSION HEADACHE: ICD-10-CM

## 2020-11-25 DIAGNOSIS — M54.2 CERVICALGIA: ICD-10-CM

## 2020-11-25 PROCEDURE — 97140 MANUAL THERAPY 1/> REGIONS: CPT | Mod: GP | Performed by: PHYSICAL THERAPY ASSISTANT

## 2020-11-25 PROCEDURE — 97110 THERAPEUTIC EXERCISES: CPT | Mod: GP | Performed by: PHYSICAL THERAPY ASSISTANT

## 2020-12-03 ENCOUNTER — THERAPY VISIT (OUTPATIENT)
Dept: PHYSICAL THERAPY | Facility: CLINIC | Age: 35
End: 2020-12-03
Payer: COMMERCIAL

## 2020-12-03 DIAGNOSIS — G44.209 TENSION HEADACHE: ICD-10-CM

## 2020-12-03 DIAGNOSIS — M54.2 CERVICALGIA: ICD-10-CM

## 2020-12-03 PROCEDURE — 97110 THERAPEUTIC EXERCISES: CPT | Mod: GP | Performed by: PHYSICAL THERAPIST

## 2020-12-03 PROCEDURE — 97140 MANUAL THERAPY 1/> REGIONS: CPT | Mod: GP | Performed by: PHYSICAL THERAPIST

## 2020-12-10 ENCOUNTER — THERAPY VISIT (OUTPATIENT)
Dept: PHYSICAL THERAPY | Facility: CLINIC | Age: 35
End: 2020-12-10
Payer: COMMERCIAL

## 2020-12-10 DIAGNOSIS — M54.2 CERVICALGIA: ICD-10-CM

## 2020-12-10 DIAGNOSIS — G44.209 TENSION HEADACHE: ICD-10-CM

## 2020-12-10 PROCEDURE — 97140 MANUAL THERAPY 1/> REGIONS: CPT | Mod: GP | Performed by: PHYSICAL THERAPIST

## 2020-12-10 PROCEDURE — 97110 THERAPEUTIC EXERCISES: CPT | Mod: GP | Performed by: PHYSICAL THERAPIST

## 2021-01-22 ENCOUNTER — TELEPHONE (OUTPATIENT)
Dept: NEUROLOGY | Facility: CLINIC | Age: 36
End: 2021-01-22

## 2021-01-22 NOTE — TELEPHONE ENCOUNTER
I called patient using  services  And patient states that she feels she needs to come into clinic for appt. Physical therapy not helping.    Laura Padron LPN

## 2021-01-26 ENCOUNTER — OFFICE VISIT (OUTPATIENT)
Dept: NEUROLOGY | Facility: CLINIC | Age: 36
End: 2021-01-26
Payer: COMMERCIAL

## 2021-01-26 VITALS
HEART RATE: 83 BPM | WEIGHT: 107 LBS | SYSTOLIC BLOOD PRESSURE: 95 MMHG | OXYGEN SATURATION: 98 % | BODY MASS INDEX: 21.57 KG/M2 | HEIGHT: 59 IN | DIASTOLIC BLOOD PRESSURE: 60 MMHG

## 2021-01-26 DIAGNOSIS — G44.209 TENSION HEADACHE: Primary | ICD-10-CM

## 2021-01-26 PROCEDURE — 99215 OFFICE O/P EST HI 40 MIN: CPT | Performed by: INTERNAL MEDICINE

## 2021-01-26 RX ORDER — AMITRIPTYLINE HYDROCHLORIDE 10 MG/1
10 TABLET ORAL AT BEDTIME
Qty: 60 TABLET | Refills: 2 | Status: SHIPPED | OUTPATIENT
Start: 2021-01-26 | End: 2022-04-26

## 2021-01-26 ASSESSMENT — MIFFLIN-ST. JEOR: SCORE: 1085.98

## 2021-01-26 NOTE — LETTER
1/26/2021         RE: Nan Ramos  [id number]  [facility] [address]        Dear Colleague,    Thank you for referring your patient, Nan Ramos, to the [facility] [place]. Please see a copy of my visit note below.    [facility] Follow Up Visit    Nan Ramos MRN# [id number]   Age: 35 year old YOB: 1985     Brief history of symptoms: The patient was initially seen in neurologic consultation on 10/2020 for evaluation of headaches. Please see the comprehensive neurologic consultation note from that date in the Epic records for details.     Interval history:   After last visit physical therapy ordered for tension type headache. This helped with the headaches for a while. Patient's mother passed away in December of brain cancer and patient had to take a break from physical therapy. Headaches have again progressed to daily over the last month. Headache is described as a right sided head pain. She also gets frequent right sided neck pain and right arm pain. Headache usually starts later in the day in the afternoon. Pain usually ranges from a 3-5/10. She has been taking the tylenol every day for the pain which seems to help.     She is occasionally doing physical therapy stretches, but would like to return to physical therapy for additional stretching.    Patient denies significant light/sound sensitivity or nausea/vomiting with the headaches.     Stress is the main trigger for headaches. She takes melatonin at night to help her sleep.      Past Medical History:     Patient Active Problem List   Diagnosis     H. pylori infection     Cervicalgia     Tension headache     Past Medical History:   Diagnosis Date     ASCUS of cervix with negative high risk HPV 08/19/2011    resolved     Seasonal allergies 2000        Past Surgical History:     Past Surgical History:   Procedure Laterality Date     HC INSERTION INTRAUTERINE DEVICE  2014, 2020    Mirena x 2     HC REMOVE  "INTRAUTERINE DEVICE  2020     HEMORRHOID SURGERY  1992     TONSILLECTOMY          Social History:     Social History     Tobacco Use     Smoking status: Never Smoker     Smokeless tobacco: Never Used   Substance Use Topics     Alcohol use: No     Drug use: No        Family History:     Family History   Problem Relation Age of Onset     Diabetes Father      Brain Cancer Mother         glioblastoma     Hypertension No family hx of      Cerebrovascular Disease No family hx of      Breast Cancer No family hx of      Cancer - colorectal No family hx of      Cancer No family hx of      Thyroid Disease No family hx of      Glaucoma No family hx of      Macular Degeneration No family hx of         Medications:     Current Outpatient Medications   Medication Sig     azelastine (OPTIVAR) 0.05 % SOLN      Capsaicin (CAPSAICIN HP) 0.1 % cream Apply topically 4 times daily as needed (Patient not taking: Reported on 10/27/2020)     cyclobenzaprine (FLEXERIL) 10 MG tablet Take 1 tablet (10 mg) by mouth 3 times daily as needed for muscle spasms     diclofenac (VOLTAREN) 75 MG EC tablet Take 1 tablet (75 mg) by mouth 2 times daily as needed for moderate pain     hydrocortisone 2.5 % cream      levonorgestrel (MIRENA) 20 MCG/24HR IUD 1 each by Intrauterine route once     omeprazole (PRILOSEC) 40 MG DR capsule Take 40 mg by mouth     Current Facility-Administered Medications   Medication     levonorgestrel (MIRENA) 20 MCG/24HR IUD 20 mcg        Allergies:   No Known Allergies     Review of Systems:   As above     Physical Exam:   Vitals: BP 95/60   Pulse 83   Ht 1.499 m (4' 11\")   Wt 48.5 kg (107 lb)   SpO2 98%   BMI 21.61 kg/m     General: Seated comfortably in no acute distress.  HEENT: Optic discs sharp and vasculature normal on funduscopic exam.   Lungs: breathing comfortably  Extremities: no edema  Skin: No rashes  Neurologic:     Mental Status: Fully alert, attentive and oriented. Normal memory and fund of knowledge. " Language normal, speech clear and fluent, no paraphasic errors.      Cranial Nerves: Visual fields intact. PERRL. EOMI with normal smooth pursuit. Facial sensation intact/symmetric. Facial movements symmetric. Hearing not formally tested but intact to conversation. Palate elevation symmetric, uvula midline. No dysarthria. Shoulder shrug strong bilaterally. Tongue protrusion midline.     Motor: No tremors or other abnormal movements observed. Muscle tone normal throughout. Strength 5/5 throughout upper and lower extremities.     Deep Tendon Reflexes: 2+/symmetric throughout upper and lower extremities.      Sensory: Intact/symmetric to light touch throughout upper and lower extremities.      Coordination: Finger-nose-finger and heel-shin intact without dysmetria.      Gait: Normal, steady casual gait.      Data reviewed on previous visits    MRI brain 9/28/2020  Impression:   1. No acute intracranial pathology.  2. Partially empty sella. Low-lying cerebellar tonsils. Slit-like  ventricles. These findings are non-specific, but can be seen in  idiopathic intracranial hypertension, should be considered as a  differential diagnosis.  3. Mild inflammatory sinus disease.  Personally reviewed        Pertinent Investigations since last visit:   none         Assessment and Plan:     Nan Ramos is a 35 year old female who presents today for follow-up of headaches. Headaches have been occurring since late summer 2020. Headache are mainly right sided without migrainous features, and typically bother her later in the day, with pain typically 4/10. She reports neck and back tension, as well as intermittent right arm pain. MRI brain with non specific changes which could be seen in IIH. Neurological exam including fundoscopic exam was unremarkable. Low suspicion currently for IIH as an etiology to headache given headache characteristics and normal fundoscopic exam. Headache characteristics are most compatible with a tension-type  headache. Patient has multiple stressors in her life which are likely contributing to tension elements. Patient previously went to physical therapy with some relief. She would like to return for additional stretching exercises. Given headaches are daily we discussed trial of amitriptyline 10 mg nightly. Increase in dosage could be considered at future visit. Also discussed limiting tylenol to 4 doses weekly, given risk of medication overuse headache.      Follow up in Neurology clinic in 2 months or earlier as needed should new concerns arise.    lFako Kim MD   of Neurology  [facility]    The total time of this encounter amounted to 40 minutes. This time included time spent with the patient, prep work, ordering tests, and performing post visit documentation.        Again, thank you for allowing me to participate in the care of your patient.        Sincerely,        Flako Kim MD

## 2021-01-26 NOTE — PROGRESS NOTES
[facility] Neurology Follow Up Visit    Nan Ramos MRN# [id number]   Age: 35 year old YOB: 1985     Brief history of symptoms: The patient was initially seen in neurologic consultation on 10/2020 for evaluation of headaches. Please see the comprehensive neurologic consultation note from that date in the Epic records for details.     Interval history:   After last visit physical therapy ordered for tension type headache. This helped with the headaches for a while. Patient's mother passed away in December of brain cancer and patient had to take a break from physical therapy. Headaches have again progressed to daily over the last month. Headache is described as a right sided head pain. She also gets frequent right sided neck pain and right arm pain. Headache usually starts later in the day in the afternoon. Pain usually ranges from a 3-5/10. She has been taking the tylenol every day for the pain which seems to help.     She is occasionally doing physical therapy stretches, but would like to return to physical therapy for additional stretching.    Patient denies significant light/sound sensitivity or nausea/vomiting with the headaches.     Stress is the main trigger for headaches. She takes melatonin at night to help her sleep.      Past Medical History:     Patient Active Problem List   Diagnosis     H. pylori infection     Cervicalgia     Tension headache     Past Medical History:   Diagnosis Date     ASCUS of cervix with negative high risk HPV 08/19/2011    resolved     Seasonal allergies 2000        Past Surgical History:     Past Surgical History:   Procedure Laterality Date     HC INSERTION INTRAUTERINE DEVICE  2014, 2020    Mirena x 2     HC REMOVE INTRAUTERINE DEVICE  2020     HEMORRHOID SURGERY  1992     TONSILLECTOMY          Social History:     Social History     Tobacco Use     Smoking status: Never Smoker     Smokeless tobacco: Never Used   Substance Use Topics     Alcohol use: No     Drug use: No  "       Family History:     Family History   Problem Relation Age of Onset     Diabetes Father      Brain Cancer Mother         glioblastoma     Hypertension No family hx of      Cerebrovascular Disease No family hx of      Breast Cancer No family hx of      Cancer - colorectal No family hx of      Cancer No family hx of      Thyroid Disease No family hx of      Glaucoma No family hx of      Macular Degeneration No family hx of         Medications:     Current Outpatient Medications   Medication Sig     azelastine (OPTIVAR) 0.05 % SOLN      Capsaicin (CAPSAICIN HP) 0.1 % cream Apply topically 4 times daily as needed (Patient not taking: Reported on 10/27/2020)     cyclobenzaprine (FLEXERIL) 10 MG tablet Take 1 tablet (10 mg) by mouth 3 times daily as needed for muscle spasms     diclofenac (VOLTAREN) 75 MG EC tablet Take 1 tablet (75 mg) by mouth 2 times daily as needed for moderate pain     hydrocortisone 2.5 % cream      levonorgestrel (MIRENA) 20 MCG/24HR IUD 1 each by Intrauterine route once     omeprazole (PRILOSEC) 40 MG DR capsule Take 40 mg by mouth     Current Facility-Administered Medications   Medication     levonorgestrel (MIRENA) 20 MCG/24HR IUD 20 mcg        Allergies:   No Known Allergies     Review of Systems:   As above     Physical Exam:   Vitals: BP 95/60   Pulse 83   Ht 1.499 m (4' 11\")   Wt 48.5 kg (107 lb)   SpO2 98%   BMI 21.61 kg/m     General: Seated comfortably in no acute distress.  HEENT: Optic discs sharp and vasculature normal on funduscopic exam.   Lungs: breathing comfortably  Extremities: no edema  Skin: No rashes  Neurologic:     Mental Status: Fully alert, attentive and oriented. Normal memory and fund of knowledge. Language normal, speech clear and fluent, no paraphasic errors.      Cranial Nerves: Visual fields intact. PERRL. EOMI with normal smooth pursuit. Facial sensation intact/symmetric. Facial movements symmetric. Hearing not formally tested but intact to conversation. " Palate elevation symmetric, uvula midline. No dysarthria. Shoulder shrug strong bilaterally. Tongue protrusion midline.     Motor: No tremors or other abnormal movements observed. Muscle tone normal throughout. Strength 5/5 throughout upper and lower extremities.     Deep Tendon Reflexes: 2+/symmetric throughout upper and lower extremities.      Sensory: Intact/symmetric to light touch throughout upper and lower extremities.      Coordination: Finger-nose-finger and heel-shin intact without dysmetria.      Gait: Normal, steady casual gait.      Data reviewed on previous visits    MRI brain 9/28/2020  Impression:   1. No acute intracranial pathology.  2. Partially empty sella. Low-lying cerebellar tonsils. Slit-like  ventricles. These findings are non-specific, but can be seen in  idiopathic intracranial hypertension, should be considered as a  differential diagnosis.  3. Mild inflammatory sinus disease.  Personally reviewed        Pertinent Investigations since last visit:   none         Assessment and Plan:     Nan Ramos is a 35 year old female who presents today for follow-up of headaches. Headaches have been occurring since late summer 2020. Headache are mainly right sided without migrainous features, and typically bother her later in the day, with pain typically 4/10. She reports neck and back tension, as well as intermittent right arm pain. MRI brain with non specific changes which could be seen in IIH. Neurological exam including fundoscopic exam was unremarkable. Low suspicion currently for IIH as an etiology to headache given headache characteristics and normal fundoscopic exam. Headache characteristics are most compatible with a tension-type headache. Patient has multiple stressors in her life which are likely contributing to tension elements. Patient previously went to physical therapy with some relief. She would like to return for additional stretching exercises. Given headaches are daily we discussed  trial of amitriptyline 10 mg nightly. Increase in dosage could be considered at future visit. Also discussed limiting tylenol to 4 doses weekly, given risk of medication overuse headache.      Follow up in Neurology clinic in 2 months or earlier as needed should new concerns arise.    Flako Kim MD   of Neurology  [facility]    The total time of this encounter amounted to 40 minutes. This time included time spent with the patient, prep work, ordering tests, and performing post visit documentation.

## 2021-01-26 NOTE — NURSING NOTE
"Nan Ramos's goals for this visit include: return  She requests these members of her care team be copied on today's visit information:     PCP: No Ref-Primary, Physician    Referring Provider:  No referring provider defined for this encounter.    BP 95/60   Pulse 83   Ht 1.499 m (4' 11\")   Wt 48.5 kg (107 lb)   SpO2 98%   BMI 21.61 kg/m      Do you need any medication refills at today's visit?   "

## 2021-04-08 PROBLEM — G44.209 TENSION HEADACHE: Status: RESOLVED | Noted: 2020-11-13 | Resolved: 2021-04-08

## 2021-04-08 PROBLEM — M54.2 CERVICALGIA: Status: RESOLVED | Noted: 2020-11-13 | Resolved: 2021-04-08

## 2021-04-08 NOTE — PROGRESS NOTES
Discharge Note    Progress reporting period is from initial evaluation date (please see noted date below) to Dec 10, 2020.  Linked Episodes   Type: Episode: Status: Noted: Resolved: Last update: Updated by:   PHYSICAL THERAPY tension headaches 11/13/20 Active 11/13/2020  12/10/2020  9:05 AM Edinson Schwartz, JAIDEN      Comments:       Nan failed to follow up and current status is unknown.  Please see information below for last relevant information on current status.  Patient seen for 4 visits.    SUBJECTIVE  Subjective changes noted by patient:  Patient reports things are getting better. Yesterday did have a little bit of a headache but also found out cause her moms cancer is getting worse. Pain mainly on right side of her head. Stretches make headache worse at times and better others. strenghtening wise has not done much.   .  Current pain level is 2/10.     Previous pain level was  6/10.   Changes in function:  Yes (See Goal flowsheet attached for changes in current functional level)  Adverse reaction to treatment or activity: None    OBJECTIVE  Changes noted in objective findings: Flexion WNL no pain, extension mild limtaiton with pull on right side of neck, right SB WNL no pain, Left SB WNL no pain. bilateral rotation WNL no pain.      ASSESSMENT/PLAN  Diagnosis: cervial tension headaches   Updated problem list and treatment plan:   Pain - HEP  STG/LTGs have been met or progress has been made towards goals:  Yes, please see goal flowsheet for most current information  Assessment of Progress: current status is unknown.    Last current status: Pt is progressing as expected   Self Management Plans:  HEP  I have re-evaluated this patient and find that the nature, scope, duration and intensity of the therapy is appropriate for the medical condition of the patient.  Nan continues to require the following intervention to meet STG and LTG's:  HEP.    Recommendations:  Discharge with current home program.  Patient to  follow up with MD as needed.    Please refer to the daily flowsheet for treatment today, total treatment time and time spent performing 1:1 timed codes.

## 2021-10-02 ENCOUNTER — HEALTH MAINTENANCE LETTER (OUTPATIENT)
Age: 36
End: 2021-10-02

## 2022-04-26 ENCOUNTER — OFFICE VISIT (OUTPATIENT)
Dept: FAMILY MEDICINE | Facility: CLINIC | Age: 37
End: 2022-04-26
Payer: COMMERCIAL

## 2022-04-26 VITALS
HEIGHT: 59 IN | DIASTOLIC BLOOD PRESSURE: 63 MMHG | HEART RATE: 92 BPM | WEIGHT: 126.6 LBS | OXYGEN SATURATION: 100 % | BODY MASS INDEX: 25.52 KG/M2 | SYSTOLIC BLOOD PRESSURE: 101 MMHG | TEMPERATURE: 96.8 F

## 2022-04-26 DIAGNOSIS — Z11.59 NEED FOR HEPATITIS C SCREENING TEST: ICD-10-CM

## 2022-04-26 DIAGNOSIS — Z13.6 CARDIOVASCULAR SCREENING; LDL GOAL LESS THAN 160: ICD-10-CM

## 2022-04-26 DIAGNOSIS — Z13.1 SCREENING FOR DIABETES MELLITUS: ICD-10-CM

## 2022-04-26 DIAGNOSIS — Z00.00 ROUTINE HISTORY AND PHYSICAL EXAMINATION OF ADULT: Primary | ICD-10-CM

## 2022-04-26 LAB
ALBUMIN SERPL-MCNC: 3.5 G/DL (ref 3.4–5)
ALP SERPL-CCNC: 50 U/L (ref 40–150)
ALT SERPL W P-5'-P-CCNC: 28 U/L (ref 0–50)
ANION GAP SERPL CALCULATED.3IONS-SCNC: 7 MMOL/L (ref 3–14)
AST SERPL W P-5'-P-CCNC: 17 U/L (ref 0–45)
BILIRUB SERPL-MCNC: 0.2 MG/DL (ref 0.2–1.3)
BUN SERPL-MCNC: 14 MG/DL (ref 7–30)
CALCIUM SERPL-MCNC: 9.1 MG/DL (ref 8.5–10.1)
CHLORIDE BLD-SCNC: 109 MMOL/L (ref 94–109)
CHOLEST SERPL-MCNC: 180 MG/DL
CO2 SERPL-SCNC: 25 MMOL/L (ref 20–32)
CREAT SERPL-MCNC: 0.47 MG/DL (ref 0.52–1.04)
FASTING STATUS PATIENT QL REPORTED: NO
GFR SERPL CREATININE-BSD FRML MDRD: >90 ML/MIN/1.73M2
GLUCOSE BLD-MCNC: 89 MG/DL (ref 70–99)
HDLC SERPL-MCNC: 45 MG/DL
LDLC SERPL CALC-MCNC: 108 MG/DL
NONHDLC SERPL-MCNC: 135 MG/DL
POTASSIUM BLD-SCNC: 4.3 MMOL/L (ref 3.4–5.3)
PROT SERPL-MCNC: 7.1 G/DL (ref 6.8–8.8)
SODIUM SERPL-SCNC: 141 MMOL/L (ref 133–144)
TRIGL SERPL-MCNC: 136 MG/DL

## 2022-04-26 PROCEDURE — 80061 LIPID PANEL: CPT | Performed by: FAMILY MEDICINE

## 2022-04-26 PROCEDURE — 80053 COMPREHEN METABOLIC PANEL: CPT | Performed by: FAMILY MEDICINE

## 2022-04-26 PROCEDURE — 99395 PREV VISIT EST AGE 18-39: CPT | Performed by: FAMILY MEDICINE

## 2022-04-26 PROCEDURE — 36415 COLL VENOUS BLD VENIPUNCTURE: CPT | Performed by: FAMILY MEDICINE

## 2022-04-26 PROCEDURE — 86803 HEPATITIS C AB TEST: CPT | Performed by: FAMILY MEDICINE

## 2022-04-26 ASSESSMENT — PAIN SCALES - GENERAL: PAINLEVEL: NO PAIN (0)

## 2022-04-26 ASSESSMENT — ENCOUNTER SYMPTOMS: HEADACHES: 1

## 2022-04-26 NOTE — PROGRESS NOTES
SUBJECTIVE:   CC: Nan Ramos is an 36 year old woman who presents for preventive health visit.       Patient has been advised of split billing requirements and indicates understanding: Yes  Headache     History of Present Illness       Reason for visit:  Regular check up  Symptom onset:  Today  Symptoms include:  N/a  Symptom progression:  Staying the same  Had these symptoms before:  Yes  Has tried/received treatment for these symptoms:  Yes  Previous treatment was successful:  No  What makes it worse:  No  What makes it better:  Yes    She eats 2-3 servings of fruits and vegetables daily.She consumes 2 sweetened beverage(s) daily.She exercises with enough effort to increase her heart rate 30 to 60 minutes per day.  She exercises with enough effort to increase her heart rate 5 days per week.   She is taking medications regularly.      Right Ear problem      Duration: Years ago    Description (location/character/radiation): hardly hear due to allergy    Intensity:  mild    Accompanying signs and symptoms: Itches    History (similar episodes/previous evaluation): None    Precipitating or alleviating factors: None    Therapies tried and outcome: None       Today's PHQ-2 Score:   PHQ-2 ( 1999 [employer]) 4/26/2022   Q1: Little interest or pleasure in doing things 0   Q2: Feeling down, depressed or hopeless 0   PHQ-2 Score 0   PHQ-2 Total Score (12-17 Years)- Positive if 3 or more points; Administer PHQ-A if positive -   Q1: Little interest or pleasure in doing things Not at all   Q2: Feeling down, depressed or hopeless Not at all   PHQ-2 Score 0       Abuse: Current or Past (Physical, Sexual or Emotional) - No  Do you feel safe in your environment? Yes    Have you ever done Advance Care Planning? (For example, a Health Directive, POLST, or a discussion with a medical provider or your loved ones about your wishes): No, advance care planning information given to patient to review.  Patient declined advance care planning  "discussion at this time.    Social History     Tobacco Use     Smoking status: Never Smoker     Smokeless tobacco: Never Used   Substance Use Topics     Alcohol use: No     If you drink alcohol do you typically have >3 drinks per day or >7 drinks per week? No    No flowsheet data found.No flowsheet data found.    Reviewed orders with patient.  Reviewed health maintenance and updated orders accordingly - Yes  Lab work is in process    Breast Cancer Screening:    FHS-7: No flowsheet data found.      History of abnormal Pap smear: NO - age 30-65 PAP every 5 years with negative HPV co-testing recommended  PAP / HPV Latest Ref Rng & Units 8/11/2020 10/23/2014 8/15/2011   PAP (Historical) - NIL NIL ASC-US(A)   HPV16 NEG:Negative Negative - -   HPV18 NEG:Negative Negative - -   HRHPV NEG:Negative Negative - -     Reviewed and updated as needed this visit by clinical staff                    Reviewed and updated as needed this visit by Provider                       Review of Systems   Neurological: Positive for headaches.     CONSTITUTIONAL: NEGATIVE for fever, chills, change in weight  INTEGUMENTARU/SKIN: NEGATIVE for worrisome rashes, moles or lesions  EYES: NEGATIVE for vision changes or irritation  ENT: NEGATIVE for ear, mouth and throat problems  RESP: NEGATIVE for significant cough or SOB  BREAST: NEGATIVE for masses, tenderness or discharge  CV: NEGATIVE for chest pain, palpitations or peripheral edema  GI: NEGATIVE for nausea, abdominal pain, heartburn, or change in bowel habits  : NEGATIVE for unusual urinary or vaginal symptoms. Periods are regular.  MUSCULOSKELETAL: NEGATIVE for significant arthralgias or myalgia  NEURO: NEGATIVE for weakness, dizziness or paresthesias  PSYCHIATRIC: NEGATIVE for changes in mood or affect     OBJECTIVE:   /63 (BP Location: Left arm, Patient Position: Sitting, Cuff Size: Adult Regular)   Pulse 92   Temp 96.8  F (36  C) (Tympanic)   Ht 1.5 m (4' 11.06\")   Wt 57.4 kg " "(126 lb 9.6 oz)   SpO2 100%   BMI 25.52 kg/m    Physical Exam  GENERAL: healthy, alert and no distress  NECK: no adenopathy, no asymmetry, masses, or scars and thyroid normal to palpation  RESP: lungs clear to auscultation - no rales, rhonchi or wheezes  CV: regular rate and rhythm, normal S1 S2, no S3 or S4, no murmur, click or rub, no peripheral edema and peripheral pulses strong  ABDOMEN: soft, nontender, no hepatosplenomegaly, no masses and bowel sounds normal  MS: no gross musculoskeletal defects noted, no edema    Diagnostic Test Results:  Labs reviewed in Epic    ASSESSMENT/PLAN:   (Z00.00) Routine history and physical examination of adult  (primary encounter diagnosis)  Comment:   Plan: as below.    (Z13.6) CARDIOVASCULAR SCREENING; LDL GOAL LESS THAN 160  Comment:   Plan: Comprehensive metabolic panel (BMP + Alb, Alk         Phos, ALT, AST, Total. Bili, TP), Lipid panel         reflex to direct LDL Non-fasting            (Z13.1) Screening for diabetes mellitus  Comment:   Plan: Comprehensive metabolic panel (BMP + Alb, Alk         Phos, ALT, AST, Total. Bili, TP)            (Z11.59) Need for hepatitis C screening test  Comment:   Plan: Hepatitis C Screen Reflex to HCV RNA Quant and         Genotype              Patient has been advised of split billing requirements and indicates understanding: Yes    COUNSELING:  Reviewed preventive health counseling, as reflected in patient instructions       Regular exercise       Healthy diet/nutrition       Vision screening    Estimated body mass index is 21.61 kg/m  as calculated from the following:    Height as of 1/26/21: 1.499 m (4' 11\").    Weight as of 1/26/21: 48.5 kg (107 lb).        She reports that she has never smoked. She has never used smokeless tobacco.      Counseling Resources:  ATP IV Guidelines  Pooled Cohorts Equation Calculator  Breast Cancer Risk Calculator  BRCA-Related Cancer Risk Assessment: FHS-7 Tool  FRAX Risk Assessment  ICSI Preventive " Guidelines  Dietary Guidelines for Americans, 2010  USDA's MyPlate  ASA Prophylaxis  Lung CA Screening    Shola Blackwell MD, MD  [facility]

## 2022-04-27 LAB — HCV AB SERPL QL IA: NONREACTIVE

## 2022-09-03 ENCOUNTER — HEALTH MAINTENANCE LETTER (OUTPATIENT)
Age: 37
End: 2022-09-03

## 2022-09-14 ENCOUNTER — OFFICE VISIT (OUTPATIENT)
Dept: FAMILY MEDICINE | Facility: CLINIC | Age: 37
End: 2022-09-14
Payer: COMMERCIAL

## 2022-09-14 VITALS
BODY MASS INDEX: 25.25 KG/M2 | TEMPERATURE: 98 F | WEIGHT: 128.6 LBS | SYSTOLIC BLOOD PRESSURE: 114 MMHG | HEART RATE: 83 BPM | OXYGEN SATURATION: 100 % | DIASTOLIC BLOOD PRESSURE: 75 MMHG | HEIGHT: 60 IN

## 2022-09-14 DIAGNOSIS — K21.9 GASTROESOPHAGEAL REFLUX DISEASE WITHOUT ESOPHAGITIS: Primary | ICD-10-CM

## 2022-09-14 PROCEDURE — 99214 OFFICE O/P EST MOD 30 MIN: CPT | Performed by: FAMILY MEDICINE

## 2022-09-14 RX ORDER — PANTOPRAZOLE SODIUM 40 MG/1
40 TABLET, DELAYED RELEASE ORAL DAILY
Qty: 90 TABLET | Refills: 3 | Status: SHIPPED | OUTPATIENT
Start: 2022-09-14 | End: 2023-03-15

## 2022-09-14 ASSESSMENT — PAIN SCALES - GENERAL: PAINLEVEL: NO PAIN (0)

## 2022-09-14 NOTE — PROGRESS NOTES
"Swapna Montana is a 37 year old presenting for the following health issues:  No chief complaint on file.      History of Present Illness       Reason for visit:  Heart burn    She eats 2-3 servings of fruits and vegetables daily.She consumes 3 sweetened beverage(s) daily.She exercises with enough effort to increase her heart rate 9 or less minutes per day.  She exercises with enough effort to increase her heart rate 3 or less days per week.   She is taking medications regularly.       Chest Pain  Onset/Duration: 2 months ago  Description:   Location: right side  Character: burning and uncomfortable  Radiation: from right side over shoulder  Duration: constant   Intensity: mild  Progression of Symptoms: constant  Accompanying Signs & Symptoms:  Shortness of breath: YES  Sweating: No  Nausea/vomiting: YES  Lightheadedness: YES  Palpitations: No  Fever/Chills: No  Cough: No           Heartburn: YES  History:   Family history of heart disease: No  Tobacco use: No  Previous similar symptoms: no   Precipitating factors:   Worse with exertion: No  Worse with deep breaths: No           Related to eating: YES           Better with burping: YES  Alleviating factors: mild  Therapies tried and outcome: mild      Review of Systems   Constitutional, HEENT, cardiovascular, pulmonary, GI, , musculoskeletal, neuro, skin, endocrine and psych systems are negative, except as otherwise noted.      Objective    /75 (BP Location: Left arm, Patient Position: Sitting, Cuff Size: Adult Regular)   Pulse 83   Temp 98  F (36.7  C) (Oral)   Ht 1.517 m (4' 11.74\")   Wt 58.3 kg (128 lb 9.6 oz)   SpO2 100%   BMI 25.33 kg/m    Body mass index is 25.33 kg/m .  Physical Exam   GENERAL: healthy, alert and no distress  NECK: no adenopathy, no asymmetry, masses, or scars and thyroid normal to palpation  RESP: lungs clear to auscultation - no rales, rhonchi or wheezes  CV: regular rate and rhythm, normal S1 S2, no S3 or S4, no murmur, " click or rub, no peripheral edema and peripheral pulses strong  ABDOMEN: soft, nontender, no hepatosplenomegaly, no masses and bowel sounds normal  MS: no gross musculoskeletal defects noted, no edema    A/P:  (K21.9) Gastroesophageal reflux disease without esophagitis  (primary encounter diagnosis)  Comment:   Plan: Helicobacter pylori Antigen Stool, pantoprazole        (PROTONIX) 40 MG EC tablet        Treat with ppi daily. R/o h pylori. If symptoms do not improve in 1 month, patient will let us know.      Shola Blackwell MD

## 2023-03-15 ENCOUNTER — OFFICE VISIT (OUTPATIENT)
Dept: FAMILY MEDICINE | Facility: CLINIC | Age: 38
End: 2023-03-15
Payer: COMMERCIAL

## 2023-03-15 VITALS
SYSTOLIC BLOOD PRESSURE: 124 MMHG | OXYGEN SATURATION: 99 % | WEIGHT: 133 LBS | DIASTOLIC BLOOD PRESSURE: 81 MMHG | HEART RATE: 80 BPM | HEIGHT: 59 IN | TEMPERATURE: 98.6 F | BODY MASS INDEX: 26.81 KG/M2

## 2023-03-15 DIAGNOSIS — Z13.1 SCREENING FOR DIABETES MELLITUS: ICD-10-CM

## 2023-03-15 DIAGNOSIS — Z13.6 CARDIOVASCULAR SCREENING; LDL GOAL LESS THAN 160: ICD-10-CM

## 2023-03-15 DIAGNOSIS — Z00.00 ROUTINE HISTORY AND PHYSICAL EXAMINATION OF ADULT: Primary | ICD-10-CM

## 2023-03-15 DIAGNOSIS — F41.9 ANXIETY: ICD-10-CM

## 2023-03-15 DIAGNOSIS — K21.9 GASTROESOPHAGEAL REFLUX DISEASE WITHOUT ESOPHAGITIS: ICD-10-CM

## 2023-03-15 DIAGNOSIS — R63.5 WEIGHT GAIN: ICD-10-CM

## 2023-03-15 LAB
ALBUMIN SERPL-MCNC: 3.8 G/DL (ref 3.4–5)
ALP SERPL-CCNC: 60 U/L (ref 40–150)
ALT SERPL W P-5'-P-CCNC: 33 U/L (ref 0–50)
ANION GAP SERPL CALCULATED.3IONS-SCNC: 5 MMOL/L (ref 3–14)
AST SERPL W P-5'-P-CCNC: 15 U/L (ref 0–45)
BILIRUB SERPL-MCNC: 0.1 MG/DL (ref 0.2–1.3)
BUN SERPL-MCNC: 12 MG/DL (ref 7–30)
CALCIUM SERPL-MCNC: 9.2 MG/DL (ref 8.5–10.1)
CHLORIDE BLD-SCNC: 108 MMOL/L (ref 94–109)
CHOLEST SERPL-MCNC: 207 MG/DL
CO2 SERPL-SCNC: 26 MMOL/L (ref 20–32)
CREAT SERPL-MCNC: 0.46 MG/DL (ref 0.52–1.04)
FASTING STATUS PATIENT QL REPORTED: NO
GFR SERPL CREATININE-BSD FRML MDRD: >90 ML/MIN/1.73M2
GLUCOSE BLD-MCNC: 86 MG/DL (ref 70–99)
HDLC SERPL-MCNC: 42 MG/DL
LDLC SERPL CALC-MCNC: 130 MG/DL
NONHDLC SERPL-MCNC: 165 MG/DL
POTASSIUM BLD-SCNC: 3.7 MMOL/L (ref 3.4–5.3)
PROT SERPL-MCNC: 7.6 G/DL (ref 6.8–8.8)
SODIUM SERPL-SCNC: 139 MMOL/L (ref 133–144)
TRIGL SERPL-MCNC: 176 MG/DL
TSH SERPL DL<=0.005 MIU/L-ACNC: 1.22 MU/L (ref 0.4–4)

## 2023-03-15 PROCEDURE — 80061 LIPID PANEL: CPT | Performed by: FAMILY MEDICINE

## 2023-03-15 PROCEDURE — 80053 COMPREHEN METABOLIC PANEL: CPT | Performed by: FAMILY MEDICINE

## 2023-03-15 PROCEDURE — 84443 ASSAY THYROID STIM HORMONE: CPT | Performed by: FAMILY MEDICINE

## 2023-03-15 PROCEDURE — 36415 COLL VENOUS BLD VENIPUNCTURE: CPT | Performed by: FAMILY MEDICINE

## 2023-03-15 PROCEDURE — 99395 PREV VISIT EST AGE 18-39: CPT | Performed by: FAMILY MEDICINE

## 2023-03-15 RX ORDER — CITALOPRAM HYDROBROMIDE 10 MG/1
10 TABLET ORAL DAILY
Qty: 90 TABLET | Refills: 3 | Status: SHIPPED | OUTPATIENT
Start: 2023-03-15 | End: 2023-06-08

## 2023-03-15 RX ORDER — PANTOPRAZOLE SODIUM 40 MG/1
40 TABLET, DELAYED RELEASE ORAL DAILY
Qty: 90 TABLET | Refills: 3 | Status: SHIPPED | OUTPATIENT
Start: 2023-03-15 | End: 2023-06-08

## 2023-03-15 ASSESSMENT — ENCOUNTER SYMPTOMS
DYSURIA: 0
CHILLS: 0
FREQUENCY: 0
PARESTHESIAS: 0
DIZZINESS: 0
NAUSEA: 1
HEARTBURN: 1
COUGH: 0
JOINT SWELLING: 0
WEAKNESS: 1
MYALGIAS: 0
HEADACHES: 1
EYE PAIN: 0
CONSTIPATION: 0
BREAST MASS: 0
SHORTNESS OF BREATH: 0
HEMATURIA: 0
SORE THROAT: 0
NERVOUS/ANXIOUS: 1
DIARRHEA: 0
FEVER: 0
ARTHRALGIAS: 0
ABDOMINAL PAIN: 0
HEMATOCHEZIA: 0
PALPITATIONS: 0

## 2023-03-15 NOTE — PROGRESS NOTES
SUBJECTIVE:   CC: Nan is an 37 year old who presents for preventive health visit.   Patient has been advised of split billing requirements and indicates understanding: Yes  Healthy Habits:     Getting at least 3 servings of Calcium per day:  NO    Bi-annual eye exam:  Yes    Dental care twice a year:  Yes    Sleep apnea or symptoms of sleep apnea:  None    Diet:  Regular (no restrictions)    Frequency of exercise:  6-7 days/week    Duration of exercise:  N/A    Taking medications regularly:  Yes    Medication side effects:  None    PHQ-2 Total Score: 2    Additional concerns today:  No      Today's PHQ-2 Score:   PHQ-2 ( 1999 [employer]) 3/15/2023   Q1: Little interest or pleasure in doing things 1   Q2: Feeling down, depressed or hopeless 1   PHQ-2 Score 2   PHQ-2 Total Score (12-17 Years)- Positive if 3 or more points; Administer PHQ-A if positive -   Q1: Little interest or pleasure in doing things Not at all   Q2: Feeling down, depressed or hopeless Several days   PHQ-2 Score 1           Social History     Tobacco Use     Smoking status: Never     Smokeless tobacco: Never   Substance Use Topics     Alcohol use: No         Alcohol Use 3/15/2023   Prescreen: >3 drinks/day or >7 drinks/week? No   No flowsheet data found.    Reviewed orders with patient.  Reviewed health maintenance and updated orders accordingly - Yes  Lab work is in process    Breast Cancer Screening:    FHS-7: No flowsheet data found.      History of abnormal Pap smear: NO - age 30-65 PAP every 5 years with negative HPV co-testing recommended  PAP / HPV Latest Ref Rng & Units 8/11/2020 10/23/2014 8/15/2011   PAP (Historical) - NIL NIL ASC-US(A)   HPV16 NEG:Negative Negative - -   HPV18 NEG:Negative Negative - -   HRHPV NEG:Negative Negative - -     Reviewed and updated as needed this visit by clinical staff   Tobacco  Allergies  Meds              Reviewed and updated as needed this visit by Provider                 Patient c/o weight gain even  "though she does not eat much.    Review of Systems   Constitutional: Negative for chills and fever.   HENT: Positive for hearing loss. Negative for congestion, ear pain and sore throat.    Eyes: Positive for visual disturbance. Negative for pain.   Respiratory: Negative for cough and shortness of breath.    Cardiovascular: Positive for chest pain. Negative for palpitations and peripheral edema.   Gastrointestinal: Positive for heartburn and nausea. Negative for abdominal pain, constipation, diarrhea and hematochezia.   Breasts:  Negative for tenderness, breast mass and discharge.   Genitourinary: Negative for dysuria, frequency, genital sores, hematuria, pelvic pain, urgency, vaginal bleeding and vaginal discharge.   Musculoskeletal: Negative for arthralgias, joint swelling and myalgias.   Skin: Negative for rash.   Neurological: Positive for weakness and headaches. Negative for dizziness and paresthesias.   Psychiatric/Behavioral: Positive for mood changes. The patient is nervous/anxious.      CONSTITUTIONAL: NEGATIVE for fever, chills, change in weight  INTEGUMENTARU/SKIN: NEGATIVE for worrisome rashes, moles or lesions  EYES: NEGATIVE for vision changes or irritation  ENT: NEGATIVE for ear, mouth and throat problems  RESP: NEGATIVE for significant cough or SOB  BREAST: NEGATIVE for masses, tenderness or discharge  CV: NEGATIVE for chest pain, palpitations or peripheral edema  GI: NEGATIVE for nausea, abdominal pain, heartburn, or change in bowel habits  : NEGATIVE for unusual urinary or vaginal symptoms. Periods are regular.  MUSCULOSKELETAL: NEGATIVE for significant arthralgias or myalgia  NEURO: NEGATIVE for weakness, dizziness or paresthesias  PSYCHIATRIC: NEGATIVE for changes in mood or affect     OBJECTIVE:   /81 (BP Location: Right arm, Patient Position: Sitting, Cuff Size: Adult Regular)   Pulse 80   Temp 98.6  F (37  C) (Tympanic)   Ht 1.499 m (4' 11\")   Wt 60.3 kg (133 lb)   SpO2 99%   BMI " "26.86 kg/m    Physical Exam  GENERAL: healthy, alert and no distress  NECK: no adenopathy, no asymmetry, masses, or scars and thyroid normal to palpation  RESP: lungs clear to auscultation - no rales, rhonchi or wheezes  CV: regular rate and rhythm, normal S1 S2, no S3 or S4, no murmur, click or rub, no peripheral edema and peripheral pulses strong  ABDOMEN: soft, nontender, no hepatosplenomegaly, no masses and bowel sounds normal  MS: no gross musculoskeletal defects noted, no edema    Diagnostic Test Results:  Labs reviewed in Epic    ASSESSMENT/PLAN:   (Z00.00) Routine history and physical examination of adult  (primary encounter diagnosis)  Comment:   Plan: as below.    (Z13.6) CARDIOVASCULAR SCREENING; LDL GOAL LESS THAN 160  Comment:   Plan: Comprehensive metabolic panel (BMP + Alb, Alk         Phos, ALT, AST, Total. Bili, TP), Lipid panel         reflex to direct LDL Non-fasting            (Z13.1) Screening for diabetes mellitus  Comment:   Plan: Comprehensive metabolic panel (BMP + Alb, Alk         Phos, ALT, AST, Total. Bili, TP)            (K21.9) Gastroesophageal reflux disease without esophagitis  Comment:   Plan: pantoprazole (PROTONIX) 40 MG EC tablet            (F41.9) Anxiety  Comment:   Plan: citalopram (CELEXA) 10 MG tablet, Adult Mental         Health  Referral            (R63.5) Weight gain  Comment:   Plan: TSH with free T4 reflex              Patient has been advised of split billing requirements and indicates understanding: Yes      COUNSELING:  Reviewed preventive health counseling, as reflected in patient instructions       Regular exercise       Healthy diet/nutrition       Vision screening      BMI:   Estimated body mass index is 25.33 kg/m  as calculated from the following:    Height as of 9/14/22: 1.517 m (4' 11.74\").    Weight as of 9/14/22: 58.3 kg (128 lb 9.6 oz).         She reports that she has never smoked. She has never used smokeless tobacco.      Shola Blackwell MD, MD [facility] " [facility]

## 2023-06-08 ENCOUNTER — VIRTUAL VISIT (OUTPATIENT)
Dept: FAMILY MEDICINE | Facility: CLINIC | Age: 38
End: 2023-06-08
Payer: COMMERCIAL

## 2023-06-08 DIAGNOSIS — M54.12 CERVICAL RADICULOPATHY: Primary | ICD-10-CM

## 2023-06-08 PROCEDURE — 99214 OFFICE O/P EST MOD 30 MIN: CPT | Mod: VID | Performed by: FAMILY MEDICINE

## 2023-06-08 RX ORDER — PREDNISONE 20 MG/1
TABLET ORAL
Qty: 20 TABLET | Refills: 0 | Status: SHIPPED | OUTPATIENT
Start: 2023-06-08 | End: 2023-09-14

## 2023-06-08 NOTE — PROGRESS NOTES
Nan is a 38 year old who is being evaluated via a billable video visit.      How would you like to obtain your AVS? MyChart  If the video visit is dropped, the invitation should be resent by: Text to cell phone: [phone]  Will anyone else be joining your video visit? No      Subjective   Nan is a 38 year old, presenting for the following health issues:  Shoulder (Pain and numbness)        6/8/2023     7:58 AM   Additional Questions   Roomed by Cristina LEO     Pain History:  When did you first notice your pain? About 1 month    Have you seen anyone else for your pain? No  How has your pain affected your ability to work? Can work part time with limitations   What type of work do you or did you do?   Where in your body do you have pain? Musculoskeletal problem/pain  Onset/Duration: About 1 month   Description  Location: Shoulder. Arm and hand - right  Joint Swelling: No  Redness: No  Pain: YES  Warmth: No  Intensity:  moderate, severe  Progression of Symptoms:  worsening and intermittent  Accompanying signs and symptoms:   Fevers: No  Numbness/tingling/weakness: YES  History  Trauma to the area: No  Recent illness:  YES  Previous similar problem: YES  Previous evaluation:  No  Precipitating or alleviating factors:  Aggravating factors include: none  Therapies tried and outcome: nothing      Review of Systems   Constitutional, HEENT, cardiovascular, pulmonary, GI, , musculoskeletal, neuro, skin, endocrine and psych systems are negative, except as otherwise noted.      Objective           Vitals:  No vitals were obtained today due to virtual visit.    Physical Exam   GENERAL: Healthy, alert and no distress  EYES: Eyes grossly normal to inspection.  No discharge or erythema, or obvious scleral/conjunctival abnormalities.  RESP: No audible wheeze, cough, or visible cyanosis.  No visible retractions or increased work of breathing.    SKIN: Visible skin clear. No significant rash, abnormal pigmentation or  lesions.  NEURO: Cranial nerves grossly intact.  Mentation and speech appropriate for age.  PSYCH: Mentation appears normal, affect normal/bright, judgement and insight intact, normal speech and appearance well-groomed.    A/P:  (M54.12) Cervical radiculopathy  (primary encounter diagnosis)  Comment:   Plan: predniSONE (DELTASONE) 20 MG tablet        Treat with oral steroidal taper first to see if this improves symptoms. If no improvements, patient will f/u in 2-3 weeks for recheck.    Shola Blackwell MD          Video-Visit Details    Type of service:  Video Visit   Video Start Time: 815AM  Video End Time:8:22 AM    Originating Location (pt. Location): Home  Distant Location (provider location):  On-site  Platform used for Video Visit: Mary Ellen

## 2023-06-08 NOTE — PATIENT INSTRUCTIONS
At [facility], we strive to deliver an exceptional experience to you, every time we see you. If you receive a survey, please complete it as we do value your feedback.  If you have MyChart, you can expect to receive results automatically within 24 hours of their completion.  Your provider will send a note interpreting your results as well.   If you do not have MyChart, you should receive your results in about a week by mail.    Your care team:                            Family Medicine Internal Medicine   MD Victorino Hernandez MD Shantel Branch-Fleming, MD Srinivasa Vaka, MD Katya Belousova, PAKIRTI Dukes CNP, MD ([facility]   Derik Lagos, MD Sakshi Pino MD Amelia Massimini APRN RAI Nicole APRN MD Romeo Lyle MD          Clinic hours: Monday - Thursday 7 am-6 pm; Fridays 7 am-5 pm.   Urgent care: Monday - Friday 10 am- 8 pm; Saturday and Sunday 9 am-5 pm.    Clinic: [phone]       [facility]: Monday - Thursday 8 am - 7 pm; Friday 8 am - 6 pm  [facility]: [phone]

## 2023-07-06 ENCOUNTER — VIRTUAL VISIT (OUTPATIENT)
Dept: FAMILY MEDICINE | Facility: CLINIC | Age: 38
End: 2023-07-06
Payer: COMMERCIAL

## 2023-07-06 DIAGNOSIS — R20.0 NUMBNESS AND TINGLING OF RIGHT ARM: Primary | ICD-10-CM

## 2023-07-06 DIAGNOSIS — R20.0 NUMBNESS AND TINGLING IN RIGHT HAND: ICD-10-CM

## 2023-07-06 DIAGNOSIS — R20.2 NUMBNESS AND TINGLING OF RIGHT ARM: Primary | ICD-10-CM

## 2023-07-06 DIAGNOSIS — R20.2 NUMBNESS AND TINGLING IN RIGHT HAND: ICD-10-CM

## 2023-07-06 PROCEDURE — 99213 OFFICE O/P EST LOW 20 MIN: CPT | Mod: VID | Performed by: FAMILY MEDICINE

## 2023-07-06 ASSESSMENT — ANXIETY QUESTIONNAIRES
5. BEING SO RESTLESS THAT IT IS HARD TO SIT STILL: NOT AT ALL
GAD7 TOTAL SCORE: 0
6. BECOMING EASILY ANNOYED OR IRRITABLE: NOT AT ALL
7. FEELING AFRAID AS IF SOMETHING AWFUL MIGHT HAPPEN: NOT AT ALL
IF YOU CHECKED OFF ANY PROBLEMS ON THIS QUESTIONNAIRE, HOW DIFFICULT HAVE THESE PROBLEMS MADE IT FOR YOU TO DO YOUR WORK, TAKE CARE OF THINGS AT HOME, OR GET ALONG WITH OTHER PEOPLE: NOT DIFFICULT AT ALL
4. TROUBLE RELAXING: NOT AT ALL
2. NOT BEING ABLE TO STOP OR CONTROL WORRYING: NOT AT ALL
GAD7 TOTAL SCORE: 0
1. FEELING NERVOUS, ANXIOUS, OR ON EDGE: NOT AT ALL
3. WORRYING TOO MUCH ABOUT DIFFERENT THINGS: NOT AT ALL

## 2023-07-06 ASSESSMENT — PATIENT HEALTH QUESTIONNAIRE - PHQ9: SUM OF ALL RESPONSES TO PHQ QUESTIONS 1-9: 1

## 2023-07-06 NOTE — PROGRESS NOTES
Nan is a 38 year old who is being evaluated via a billable video visit.      How would you like to obtain your AVS? MyChart  If the video visit is dropped, the invitation should be resent by: Text to cell phone: [phone]  Will anyone else be joining your video visit? No      Subjective   Nan is a 38 year old, presenting for the following health issues:  Shoulder Pain (Follow up )        7/6/2023     8:22 AM   Additional Questions   Roomed by Cristina LEO     Pain History:  When did you first notice your pain? 2 months    Have you seen this provider for your pain in the past?   Yes   Where in your body do you have pain? Shoulder  Are you seeing anyone else for your pain? No    Patient stated initially pain, numbness, tingling radiated from neck to right arm to hands. Prednisone was given and for the most part symptoms improve but still there. Patient stated she had numbness and tingling of the 2nd right index finger but this is slowly improving. Patient is a .        5/21/2014     8:00 AM 7/6/2023     8:25 AM   PHQ-9 SCORE   PHQ-9 Total Score 2    PHQ-9 Total Score  1         7/6/2023     8:29 AM   RIA-7 SCORE   Total Score 0         7/6/2023     8:31 AM   PEG Score   PEG Total Score 7       PDMP Review     None        Last CSA Agreement:   CSA -- Patient Level:    CSA: None found at the patient level.       Review of Systems   Constitutional, HEENT, cardiovascular, pulmonary, GI, , musculoskeletal, neuro, skin, endocrine and psych systems are negative, except as otherwise noted.      Objective           Vitals:  No vitals were obtained today due to virtual visit.    Physical Exam   GENERAL: Healthy, alert and no distress  EYES: Eyes grossly normal to inspection.  No discharge or erythema, or obvious scleral/conjunctival abnormalities.  RESP: No audible wheeze, cough, or visible cyanosis.  No visible retractions or increased work of breathing.    SKIN: Visible skin clear. No significant rash,  abnormal pigmentation or lesions.  NEURO: Cranial nerves grossly intact.  Mentation and speech appropriate for age.  PSYCH: Mentation appears normal, affect normal/bright, judgement and insight intact, normal speech and appearance well-groomed.    A/P:  (R20.0,  R20.2) Numbness and tingling of right arm  (primary encounter diagnosis)  Comment:   Plan: Adult Neurology  Referral        Cervical radiculopathy and/or CTS? Patient referred to Neurology for evaluation and recommendations.    (R20.0,  R20.2) Numbness and tingling in right hand  Comment:   Plan: Adult Neurology  Referral            Shola Blackwell MD          Video-Visit Details    Type of service:  Video Visit   Video Start Time: 850AM  Video End Time:8:57 AM    Originating Location (pt. Location): Home  Distant Location (provider location):  On-site  Platform used for Video Visit: Well

## 2023-07-06 NOTE — PATIENT INSTRUCTIONS
At [facility], we strive to deliver an exceptional experience to you, every time we see you. If you receive a survey, please complete it as we do value your feedback.  If you have MyChart, you can expect to receive results automatically within 24 hours of their completion.  Your provider will send a note interpreting your results as well.   If you do not have MyChart, you should receive your results in about a week by mail.    Your care team:                            Family Medicine Internal Medicine   MD Victorino Hernandez MD Shantel Branch-Fleming, MD Srinivasa Vaka, MD Katya Belousova, PAKIRTI Dukes CNP, MD ([facility]   Derik Lagos, MD Sakshi Pino MD Amelia Massimini APRN RAI Nicole APRN MD Romeo Lyle MD          Clinic hours: Monday - Thursday 7 am-6 pm; Fridays 7 am-5 pm.   Urgent care: Monday - Friday 10 am- 8 pm; Saturday and Sunday 9 am-5 pm.    Clinic: [phone]       [facility]: Monday - Thursday 8 am - 7 pm; Friday 8 am - 6 pm  [facility]: [phone]

## 2023-09-14 ENCOUNTER — VIRTUAL VISIT (OUTPATIENT)
Dept: FAMILY MEDICINE | Facility: CLINIC | Age: 38
End: 2023-09-14
Payer: COMMERCIAL

## 2023-09-14 DIAGNOSIS — M54.2 NECK PAIN: Primary | ICD-10-CM

## 2023-09-14 DIAGNOSIS — M79.604 PAIN OF RIGHT LOWER EXTREMITY: ICD-10-CM

## 2023-09-14 DIAGNOSIS — M54.16 LUMBAR RADICULOPATHY: ICD-10-CM

## 2023-09-14 DIAGNOSIS — M54.12 CERVICAL RADICULOPATHY: ICD-10-CM

## 2023-09-14 PROCEDURE — 99214 OFFICE O/P EST MOD 30 MIN: CPT | Mod: VID | Performed by: FAMILY MEDICINE

## 2023-09-14 RX ORDER — MELOXICAM 7.5 MG/1
7.5 TABLET ORAL DAILY
Qty: 90 TABLET | Refills: 1 | Status: SHIPPED | OUTPATIENT
Start: 2023-09-14

## 2023-09-14 NOTE — PROGRESS NOTES
Nan is a 38 year old who is being evaluated via a billable video visit.      How would you like to obtain your AVS? MyChart  If the video visit is dropped, the invitation should be resent by: Text to cell phone: [phone]  Will anyone else be joining your video visit? No      Subjective   Nan is a 38 year old, presenting for the following health issues:  Neck Pain, Knee Pain, and Musculoskeletal Problem        9/14/2023     7:45 AM   Additional Questions   Roomed by Frank       History of Present Illness       Back Pain:  She presents for follow up of back pain. Patient's back pain is a recurring problem.  Location of back pain:  Right side of neck  Description of back pain: cramping and dull ache  Back pain spreads: right thigh, right shoulder and right knee    Since patient first noticed back pain, pain is: unchanged  Does back pain interfere with her job:  Yes       Reason for visit:  Right Ankle and knee pain        Pain History:  When did you first notice your pain? 2-3 weeks   Have you seen anyone else for your pain? No  How has your pain affected your ability to work? Pain does not limit ability to work   What type of work do you or did you do?   Where in your body do you have pain? Musculoskeletal problem/pain  Onset/Duration: 2-3 weeks  Description  Location: knee and ankle - right  Joint Swelling: No  Redness: No  Pain: YES  Warmth: No  Intensity:  Sometimes mild sometimes moderate   Progression of Symptoms:  same and constant  Accompanying signs and symptoms:   Fevers: No  Numbness/tingling/weakness: YES- occasionally numbness  History  Trauma to the area: No  Recent illness:  No  Previous similar problem: No  Previous evaluation:  No  Precipitating or alleviating factors:  Aggravating factors include: walking and climbing stairs  Therapies tried and outcome: rest/inactivity    Patient has ongoing neck pain with radiation of pain down right arm with numbness/tingling intermittently. She  also has right leg numbness and tingling sensations as well.      Review of Systems   Constitutional, HEENT, cardiovascular, pulmonary, GI, , musculoskeletal, neuro, skin, endocrine and psych systems are negative, except as otherwise noted.      Objective           Vitals:  No vitals were obtained today due to virtual visit.    Physical Exam   GENERAL: Healthy, alert and no distress  EYES: Eyes grossly normal to inspection.  No discharge or erythema, or obvious scleral/conjunctival abnormalities.  RESP: No audible wheeze, cough, or visible cyanosis.  No visible retractions or increased work of breathing.    SKIN: Visible skin clear. No significant rash, abnormal pigmentation or lesions.  NEURO: Cranial nerves grossly intact.  Mentation and speech appropriate for age.  PSYCH: Mentation appears normal, affect normal/bright, judgement and insight intact, normal speech and appearance well-groomed.    A/P:    (M54.2) Neck pain  (primary encounter diagnosis)  Comment:   Plan: meloxicam (MOBIC) 7.5 MG tablet, MR Cervical         Spine w/o Contrast, Physical Therapy Referral        Refer patient to physical therapy for evaluation and treatment. MRI scan to r/o nerve impingement.    (M54.12) Cervical radiculopathy  Comment:   Plan: meloxicam (MOBIC) 7.5 MG tablet, MR Cervical         Spine w/o Contrast, Physical Therapy Referral            (M54.16) Lumbar radiculopathy  Comment:   Plan: meloxicam (MOBIC) 7.5 MG tablet, MR Lumbar         Spine w/o Contrast, Physical Therapy Referral        Refer to physical therapy for evaluation and treatment. MRI scan ordered to rule out nerve impingement.    (M79.604) Pain of right lower extremity  Comment:   Plan: meloxicam (MOBIC) 7.5 MG tablet, MR Lumbar         Spine w/o Contrast, Physical Therapy Referral            Shola Blackwell MD          Video-Visit Details    Type of service:  Video Visit   Video Start Time:  800AM  Video End Time:8:10 AM    Originating Location (pt. Location):  Home    Distant Location (provider location):  On-site  Platform used for Video Visit: Mary Ellen

## 2023-10-05 NOTE — TELEPHONE ENCOUNTER
RECORDS RECEIVED FROM: Internal   REASON FOR VISIT: Numbness and tingling of right arm [R20.0, R20.2]  Numbness and tingling in right hand [R20.0, R20.2]    Date of Appt: 01/02/2024    NOTES (FOR ALL VISITS) STATUS DETAILS   OFFICE NOTE from referring provider Internal 07/06/2023 Dr Blackwell [facility]    OFFICE NOTE from other specialist Internal 01/26/2021 Dr Kim [facility]   12/10/2020 PT [facility]   DISCHARGE SUMMARY from hospital N/A    DISCHARGE REPORT from the ER N/A    OPERATIVE REPORT N/A    [facility] (MS ONLY) N/A    EMG N/A    EEG N/A    MEDICATION LIST N/A    IMAGING  (FOR ALL VISITS)     LUMBAR PUNCTURE N/A    KHALIDA SCAN (MOVEMENT) N/A    ULTRASOUND (CAROTID BILAT) *VASCULAR* N/A    MRI (HEAD, NECK, SPINE) N/A    CT (HEAD, NECK, SPINE) N/A

## 2024-01-02 ENCOUNTER — PRE VISIT (OUTPATIENT)
Dept: NEUROLOGY | Facility: CLINIC | Age: 39
End: 2024-01-02

## 2024-02-14 ENCOUNTER — PATIENT OUTREACH (OUTPATIENT)
Dept: CARE COORDINATION | Facility: CLINIC | Age: 39
End: 2024-02-14
Payer: COMMERCIAL

## 2024-02-28 ENCOUNTER — PATIENT OUTREACH (OUTPATIENT)
Dept: CARE COORDINATION | Facility: CLINIC | Age: 39
End: 2024-02-28
Payer: COMMERCIAL

## 2024-04-27 ENCOUNTER — HEALTH MAINTENANCE LETTER (OUTPATIENT)
Age: 39
End: 2024-04-27

## 2024-09-11 ENCOUNTER — PATIENT OUTREACH (OUTPATIENT)
Dept: CARE COORDINATION | Facility: CLINIC | Age: 39
End: 2024-09-11
Payer: COMMERCIAL

## 2025-05-11 ENCOUNTER — HEALTH MAINTENANCE LETTER (OUTPATIENT)
Age: 40
End: 2025-05-11